# Patient Record
Sex: MALE | Race: WHITE | NOT HISPANIC OR LATINO | Employment: FULL TIME | URBAN - METROPOLITAN AREA
[De-identification: names, ages, dates, MRNs, and addresses within clinical notes are randomized per-mention and may not be internally consistent; named-entity substitution may affect disease eponyms.]

---
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
## 2020-01-20 DIAGNOSIS — F39 MOOD DISORDER (H): ICD-10-CM

## 2020-01-20 RX ORDER — CITALOPRAM HYDROBROMIDE 20 MG/1
TABLET ORAL
Qty: 31 TABLET | Refills: 2 | Status: SHIPPED | OUTPATIENT
Start: 2020-01-20 | End: 2020-03-02

## 2020-01-20 NOTE — TELEPHONE ENCOUNTER
"Requested Prescriptions   Pending Prescriptions Disp Refills     citalopram (CELEXA) 20 MG tablet [Pharmacy Med Name: CITALOPRAM HYDROBROMIDE 20MG TABS] 31 tablet 2     Sig: TAKE ONE TABLET (20MG) BY MOUTH ONCE DAILY - ADD TO THE 10MG TABLET       SSRIs Protocol Passed - 1/20/2020 10:34 AM        Passed - Recent (12 mo) or future (30 days) visit within the authorizing provider's specialty     Patient has had an office visit with the authorizing provider or a provider within the authorizing providers department within the previous 12 mos or has a future within next 30 days. See \"Patient Info\" tab in inbasket, or \"Choose Columns\" in Meds & Orders section of the refill encounter.              Passed - Medication is active on med list        Passed - Patient is age 18 or older        Last Written Prescription Date:  12/29/1+9  Last Fill Quantity: 90,  # refills: 1   Last office visit: 3/28/2019 with prescribing provider:     Future Office Visit:      "

## 2020-02-17 ENCOUNTER — OFFICE VISIT (OUTPATIENT)
Dept: FAMILY MEDICINE | Facility: CLINIC | Age: 20
End: 2020-02-17
Payer: COMMERCIAL

## 2020-02-17 VITALS
SYSTOLIC BLOOD PRESSURE: 117 MMHG | HEART RATE: 64 BPM | TEMPERATURE: 97.7 F | BODY MASS INDEX: 20.79 KG/M2 | WEIGHT: 140.4 LBS | DIASTOLIC BLOOD PRESSURE: 56 MMHG | RESPIRATION RATE: 14 BRPM | HEIGHT: 69 IN

## 2020-02-17 DIAGNOSIS — F41.1 GAD (GENERALIZED ANXIETY DISORDER): Primary | ICD-10-CM

## 2020-02-17 PROCEDURE — 99213 OFFICE O/P EST LOW 20 MIN: CPT | Performed by: NURSE PRACTITIONER

## 2020-02-17 ASSESSMENT — ANXIETY QUESTIONNAIRES
2. NOT BEING ABLE TO STOP OR CONTROL WORRYING: NOT AT ALL
5. BEING SO RESTLESS THAT IT IS HARD TO SIT STILL: NOT AT ALL
GAD7 TOTAL SCORE: 1
1. FEELING NERVOUS, ANXIOUS, OR ON EDGE: NOT AT ALL
7. FEELING AFRAID AS IF SOMETHING AWFUL MIGHT HAPPEN: NOT AT ALL
3. WORRYING TOO MUCH ABOUT DIFFERENT THINGS: NOT AT ALL
6. BECOMING EASILY ANNOYED OR IRRITABLE: SEVERAL DAYS

## 2020-02-17 ASSESSMENT — ENCOUNTER SYMPTOMS
PSYCHIATRIC NEGATIVE: 1
NERVOUS/ANXIOUS: 0
CARDIOVASCULAR NEGATIVE: 1
CONSTITUTIONAL NEGATIVE: 1
RESPIRATORY NEGATIVE: 1

## 2020-02-17 ASSESSMENT — PATIENT HEALTH QUESTIONNAIRE - PHQ9: 5. POOR APPETITE OR OVEREATING: NOT AT ALL

## 2020-02-17 ASSESSMENT — MIFFLIN-ST. JEOR: SCORE: 1642.23

## 2020-02-17 NOTE — PROGRESS NOTES
"Subjective     David Dejesus is a 19 year old male who presents to clinic today for the following health issues:    HPI     * Medication question-  Unsure if he is taking the correct dose of citalopram.  Is currently taking 30 mg.  Has been on it for just a few months.  Makes him feel crabby, has \"weird\" moods.    In addition: Doesn't feel like himself. Doesn't feel happy. No energy. Has been feeling like this for a few months. Went a week without taking it, felt a lot better/more energy. Now that he's back on it for 2 weeks, he feels more \"annoyed\" and \"bothered.\"    Denies any suicidal ideation.      Patient Active Problem List   Diagnosis     Rage attacks     ROYCE (generalized anxiety disorder)     Chronic insomnia     Acne vulgaris     History reviewed. No pertinent surgical history.    Social History     Tobacco Use     Smoking status: Never Smoker     Smokeless tobacco: Current User     Types: Chew     Tobacco comment: Uses a vape   Substance Use Topics     Alcohol use: No     History reviewed. No pertinent family history.      Current Outpatient Medications   Medication Sig Dispense Refill     citalopram (CELEXA) 10 MG tablet TAKE ONE TABLET (10MG) BY MOUTH ONCE DAILY - ADD TO THE 20MG DAILY 90 tablet 1     citalopram (CELEXA) 20 MG tablet TAKE ONE TABLET (20MG) BY MOUTH ONCE DAILY - ADD TO THE 10MG TABLET 31 tablet 2     cholecalciferol (VITAMIN D3) 5000 units TABS tablet Take 5,000 Units by mouth daily       ISOtretinoin (ABSORICA) 30 MG capsule Take 1 capsule (30 mg) by mouth 2 times daily (with meals) (Patient not taking: Reported on 2/17/2020) 60 capsule 0     melatonin 3 MG tablet Take 10 mg by mouth nightly as needed        triamcinolone (KENALOG) 0.025 % external ointment Apply twice daily affected areas on lips as needed for next 2-3 weeks. (Patient not taking: Reported on 8/2/2019) 15 g 3     triamcinolone (KENALOG) 0.1 % paste Apply twice daily to affected areas inside mouth. (Patient not taking: " Reported on 8/2/2019) 5 g 4     No Known Allergies  BP Readings from Last 3 Encounters:   02/17/20 117/56   08/02/19 100/69   07/12/19 110/56    Wt Readings from Last 3 Encounters:   02/17/20 63.7 kg (140 lb 6.4 oz) (27 %)*   07/12/19 62.8 kg (138 lb 6.4 oz) (27 %)*   02/13/19 66 kg (145 lb 6.4 oz) (42 %)*     * Growth percentiles are based on [facility] (Boys, 2-20 Years) data.                  ROYCE-7 SCORE 1/21/2019 3/28/2019 2/17/2020   Total Score - - -   Total Score 1 3 1           Reviewed and updated as needed this visit by Provider         Review of Systems   Constitutional: Negative.         Decreased energy   Respiratory: Negative.    Cardiovascular: Negative.    Psychiatric/Behavioral: Negative.  The patient is not nervous/anxious.             Objective    There were no vitals taken for this visit.  There is no height or weight on file to calculate BMI.  Physical Exam  Vitals signs and nursing note reviewed.   Constitutional:       Appearance: Normal appearance. He is not ill-appearing or toxic-appearing.   Skin:     General: Skin is warm and dry.   Neurological:      General: No focal deficit present.      Mental Status: He is alert and oriented to person, place, and time.   Psychiatric:         Mood and Affect: Mood normal.         Behavior: Behavior normal.         Thought Content: Thought content normal.         Judgment: Judgment normal.                    Assessment & Plan     1. ROYCE (generalized anxiety disorder)  Patient wants to decrease Celexa. States he felt so much better (more energy) when he was off it for a week. Denies anxiety, ROYCE filled out during visit. Denies any other s/e. Recommended he make follow-up appt with PCP in 2 weeks to assess dosage change and to see if he wants to continue to decrease and or change to a different medication.             See Patient Instructions    Return in about 2 weeks (around 3/2/2020) for Routine Visit f/u since dose decrease.    EILEEN Huntley  [facility]

## 2020-02-17 NOTE — NURSING NOTE
"Chief Complaint   Patient presents with     Medication Problem       Initial /56 (BP Location: Right arm, Patient Position: Chair, Cuff Size: Adult Regular)   Pulse 64   Temp 97.7  F (36.5  C) (Tympanic)   Resp 14   Ht 1.753 m (5' 9\")   Wt 63.7 kg (140 lb 6.4 oz)   BMI 20.73 kg/m   Estimated body mass index is 20.73 kg/m  as calculated from the following:    Height as of this encounter: 1.753 m (5' 9\").    Weight as of this encounter: 63.7 kg (140 lb 6.4 oz).    Patient presents to the clinic using No DME    Health Maintenance that is potentially due pending provider review:  NONE        Is there anyone who you would like to be able to receive your results? No  If yes have patient fill out FALGUNI      "

## 2020-02-17 NOTE — PATIENT INSTRUCTIONS
Anxiety:   1. Decrease Celexa from 30mg to 20mg daily. Take at the same time every day. If you notice any increase in anxiety or rage attacks, please follow-up with PCP right away.  2. Make follow-up appt with your PCP for 2 weeks from now to discuss change in medication.

## 2020-02-18 ASSESSMENT — ANXIETY QUESTIONNAIRES: GAD7 TOTAL SCORE: 1

## 2020-03-02 ENCOUNTER — OFFICE VISIT (OUTPATIENT)
Dept: FAMILY MEDICINE | Facility: CLINIC | Age: 20
End: 2020-03-02
Payer: COMMERCIAL

## 2020-03-02 VITALS
RESPIRATION RATE: 16 BRPM | SYSTOLIC BLOOD PRESSURE: 118 MMHG | BODY MASS INDEX: 20.97 KG/M2 | TEMPERATURE: 98.4 F | WEIGHT: 142 LBS | DIASTOLIC BLOOD PRESSURE: 64 MMHG

## 2020-03-02 DIAGNOSIS — F39 MOOD DISORDER (H): ICD-10-CM

## 2020-03-02 PROCEDURE — 99214 OFFICE O/P EST MOD 30 MIN: CPT | Performed by: NURSE PRACTITIONER

## 2020-03-02 RX ORDER — CITALOPRAM HYDROBROMIDE 20 MG/1
20 TABLET ORAL DAILY
Qty: 90 TABLET | Refills: 3 | Status: SHIPPED | OUTPATIENT
Start: 2020-03-02 | End: 2021-07-08

## 2020-03-02 NOTE — NURSING NOTE
"Chief Complaint   Patient presents with     Anxiety       Initial /64 (BP Location: Right arm, Patient Position: Chair, Cuff Size: Adult Regular)   Temp 98.4  F (36.9  C) (Tympanic)   Resp 16   Wt 64.4 kg (142 lb)   BMI 20.97 kg/m   Estimated body mass index is 20.97 kg/m  as calculated from the following:    Height as of 2/17/20: 1.753 m (5' 9\").    Weight as of this encounter: 64.4 kg (142 lb).    Patient presents to the clinic using No DME    Health Maintenance that is potentially due pending provider review:  NONE    n/a    Is there anyone who you would like to be able to receive your results? No  If yes have patient fill out FALGUNI  Levi Vanegas M.A.        "

## 2020-03-02 NOTE — PROGRESS NOTES
Subjective     David Dejesus is a 19 year old male who presents to clinic today for the following health issues:    HPI   Anxiety Follow-Up    How are you doing with your anxiety since your last visit? Improved Has decreased Celexa to 20 mg a day and states is doing better     Just the past month or so felt funny at the 30 mg     This medication works for him. Much less irritable. Working [place] now.finished High School     Are you having other symptoms that might be associated with anxiety? No    Have you had a significant life event? No     Are you feeling depressed? No    Do you have any concerns with your use of alcohol or other drugs? No    Social History     Tobacco Use     Smoking status: Never Smoker     Smokeless tobacco: Current User     Types: Chew     Tobacco comment: Uses a vape   Substance Use Topics     Alcohol use: No     Drug use: No     ROYCE-7 SCORE 1/21/2019 3/28/2019 2/17/2020   Total Score - - -   Total Score 1 3 1     PHQ 11/30/2018 1/21/2019 3/28/2019   PHQ-9 Total Score 7 4 4   Q9: Thoughts of better off dead/self-harm past 2 weeks Not at all Not at all Not at all     Last PHQ-9 3/28/2019   1.  Little interest or pleasure in doing things 0   2.  Feeling down, depressed, or hopeless 0   3.  Trouble falling or staying asleep, or sleeping too much 1   4.  Feeling tired or having little energy 1   5.  Poor appetite or overeating 2   6.  Feeling bad about yourself 0   7.  Trouble concentrating 0   8.  Moving slowly or restless 0   Q9: Thoughts of better off dead/self-harm past 2 weeks 0   PHQ-9 Total Score 4   Difficulty at work, home, or with people Not difficult at all     ROYCE-7  2/17/2020   1. Feeling nervous, anxious, or on edge 0   2. Not being able to stop or control worrying 0   3. Worrying too much about different things 0   4. Trouble relaxing 0   5. Being so restless that it is hard to sit still 0   6. Becoming easily annoyed or irritable 1   7. Feeling afraid, as if something awful might  happen 0   ROYCE-7 Total Score 1   If you checked any problems, how difficult have they made it for you to do your work, take care of things at home, or get along with other people? -         How many servings of fruits and vegetables do you eat daily?  0-1    On average, how many sweetened beverages do you drink each day (Examples: soda, juice, sweet tea, etc.  Do NOT count diet or artificially sweetened beverages)?   1    How many days per week do you exercise enough to make your heart beat faster? 3 or less    How many minutes a day do you exercise enough to make your heart beat faster? 9 or less    How many days per week do you miss taking your medication? 0    -------------------------------------    Patient Active Problem List   Diagnosis     Rage attacks     ROYCE (generalized anxiety disorder)     Chronic insomnia     Acne vulgaris     No past surgical history on file.    Social History     Tobacco Use     Smoking status: Never Smoker     Smokeless tobacco: Current User     Types: Chew     Tobacco comment: Uses a vape   Substance Use Topics     Alcohol use: No     No family history on file.        -------------------------------------  Reviewed and updated as needed this visit by Provider         Review of Systems   ROS COMP: Constitutional, HEENT, cardiovascular, pulmonary, GI, , musculoskeletal, neuro, skin, endocrine and psych systems are negative, except as otherwise noted.      Objective    /64 (BP Location: Right arm, Patient Position: Chair, Cuff Size: Adult Regular)   Temp 98.4  F (36.9  C) (Tympanic)   Resp 16   Wt 64.4 kg (142 lb)   BMI 20.97 kg/m    Body mass index is 20.97 kg/m .  Physical Exam   GENERAL: healthy, alert and no distress  EYES: Eyes grossly normal to inspection, PERRL and conjunctivae and sclerae normal  HENT: ear canals and TM's normal, nose and mouth without ulcers or lesions  NECK: no adenopathy, no asymmetry, masses, or scars and thyroid normal to palpation  RESP: lungs  clear to auscultation - no rales, rhonchi or wheezes  CV: regular rate and rhythm, normal S1 S2, no S3 or S4, no murmur, click or rub, no peripheral edema and peripheral pulses strong  ABDOMEN: soft, nontender, no hepatosplenomegaly, no masses and bowel sounds normal  MS: no gross musculoskeletal defects noted, no edema  SKIN: no suspicious lesions or rashes  NEURO: Normal strength and tone, mentation intact and speech normal  PSYCH: mentation appears normal, affect normal/bright    Diagnostic Test Results:  Labs reviewed in Epic  No results found for any visits on 03/02/20.        Assessment & Plan     David was seen today for anxiety.    Diagnoses and all orders for this visit:    Mood disorder (H)  -     citalopram (CELEXA) 20 MG tablet; Take 1 tablet (20 mg) by mouth daily    continue citalopram 20 mg daily  Flu vaccine declined  HPV will consider after discuss with Mom  See dermatology before restarting isotretinoin         Call or return to the clinic with any worsening of symptoms or no resolution. Patient/Parent verbalized understanding and is in agreement. Medication side effects reviewed.   Current Outpatient Medications   Medication Sig Dispense Refill     citalopram (CELEXA) 20 MG tablet Take 1 tablet (20 mg) by mouth daily 90 tablet 3     cholecalciferol (VITAMIN D3) 5000 units TABS tablet Take 5,000 Units by mouth daily       melatonin 3 MG tablet Take 10 mg by mouth nightly as needed        triamcinolone (KENALOG) 0.025 % external ointment Apply twice daily affected areas on lips as needed for next 2-3 weeks. (Patient not taking: Reported on 8/2/2019) 15 g 3     triamcinolone (KENALOG) 0.1 % paste Apply twice daily to affected areas inside mouth. (Patient not taking: Reported on 8/2/2019) 5 g 4       See Patient Instructions    Follow up MH 6 months can be TV if needed.    EILEEN Phelps [facility]

## 2020-05-12 ENCOUNTER — VIRTUAL VISIT (OUTPATIENT)
Dept: FAMILY MEDICINE | Facility: CLINIC | Age: 20
End: 2020-05-12
Payer: COMMERCIAL

## 2020-05-12 DIAGNOSIS — L23.7 CONTACT DERMATITIS DUE TO POISON IVY: Primary | ICD-10-CM

## 2020-05-12 PROCEDURE — 99213 OFFICE O/P EST LOW 20 MIN: CPT | Mod: 95 | Performed by: NURSE PRACTITIONER

## 2020-05-12 RX ORDER — PREDNISONE 20 MG/1
TABLET ORAL
Qty: 10 TABLET | Refills: 0 | Status: SHIPPED | OUTPATIENT
Start: 2020-05-12 | End: 2020-07-30

## 2020-05-12 NOTE — PATIENT INSTRUCTIONS
"  Patient Education     Contact Dermatitis  Contact dermatitis is a skin rash caused by something that touches the skin and makes it irritated and inflamed. Your skin may be red, swollen, dry, and may be cracked. Blisters may form and ooze. The rash will itch.  Contact dermatitis can form on the face and neck, backs of hands, forearms, genitals, and lower legs.  People can get contact dermatitis from lots of sources. These include:    Plants such as poison ivy, oak, or sumac    Chemicals in hair dyes and rinses, soaps, solvents, waxes, fingernail polish, and deodorants     Jewelry or watchbands made of nickel  Contact dermatitis is not passed from person to person.  Talk with your healthcare provider about what may have caused the rash. A type of allergy testing called \"patch testing\" may be used to discover what you are allergic to. You will need to avoid the source of your rash in the future to prevent it from coming back.  Treatment is done to relieve itching and prevent the rash from coming back. The rash should go away in a few days to a few weeks.  Home care  Your healthcare provider may prescribe medicine to relieve swelling and itching. Follow all instructions when using these medicines.  General care:    Avoid anything that heats up your skin, such as hot showers or baths, or direct sunlight. This can make itching worse.    Apply cold compresses to soothe your sores to help relieve your symptoms. Do this for 30 minutes 3 to 4 times a day. You can make a cold compress by soaking a cloth in cold water. Squeeze out excess water. You can add colloidal oatmeal to the water to help reduce itching. For severe itching in a small area, apply an ice pack wrapped in a thin towel. Do this for 20 minutes 3 to 4 times a day.    You can also try wet dressings. One way to do this is to wear a wet piece of clothing under a dry one. Wear a damp shirt under a dry shirt if your upper body is affected. This can relieve itching " and prevent you from scratching the affected area.    You can also help relieve large areas of itching by taking a lukewarm bath with colloidal oatmeal added to the water.    Use hydrocortisone cream for redness and irritation, unless another medicine was prescribed. You can also use benzocaine anesthetic cream or spray. Calamine lotion can also relieve mild symptoms.    Use oral diphenhydramine to help reduce itching. You can buy this antihistamine at drug and grocery stores. It can make you sleepy, so use lower doses during the daytime. Or you can use loratadine. This is an antihistamine that will not make you sleepy. Do not use diphenhydramine if you have glaucoma or have trouble urinating due to an enlarged prostate.    If a plant causes your rash, make sure to wash your skin and the clothes you were wearing when you came into contact with the plant. This is to wash away the plant oils that gave you the rash and prevent more or worse symptoms.    Stay away from the substance or object that causes your symptoms. If you can t avoid it, wear gloves or some other type of protection.  Follow-up care  Follow up with your healthcare provider, or as advised.  When to seek medical advice  Call your healthcare provider right away if any of these occur:    Spreading of the rash to other parts of your body    Severe swelling of your face, eyelids, mouth, throat or tongue    Trouble urinating due to swelling in the genital area    Fever of 100.4 F (38 C) or higher    Redness or swelling that gets worse    Pain that gets worse    Foul-smelling fluid leaking from the skin    Yellow-brown crusts on the open blisters  Date Last Reviewed: 9/1/2016 2000-2019 [employer]. [address] [place] [address], [place] [address]. All rights reserved. This information is not intended as a substitute for professional medical care. Always follow your healthcare professional's instructions.           Patient Education     Managing a  Poison Ivy, Poison Oak, or Poison Sumac Reaction  If you come in contact with urushiol    If you think you may have come in contact with the sap oil (called urushiol) contained in poison ivy, poison oak, or poison sumac plants, wash the affected part of your skin. Do this within 15 minutes after contact. Use water or preferably, soap and water.  Undress, and wash your clothes and gear as soon as you can. Be sure to wash any pet that was with you. Taking these steps can help prevent spreading sap oil to someone else. If you have a rash, but are not sure if it is from one of these plants, see your healthcare provider.  To soothe the itching  Your skin may react to poison oak, poison ivy, and poison sumac within hours to a few days after contact. Once you have come into contact with these plants, you can t stop the reaction. But you can take these steps to soothe the itching:    Don t scratch or scrub your rash. Not even if the itching is severe. Scratching can lead to infection.    Bathe in lukewarm (not hot) water. Or take short cool showers to relieve the itching. For a more soothing bath, add oatmeal to the water.    Use antihistamines that are taken by mouth. These include diphenhydramine. You can buy these at the pharmacy. Talk to your healthcare provider or pharmacist for more information on oral antihistamines.    Use over-the-counter treatments on your skin. These include cortisone and calamine lotion.  How your skin may react  A mild rash may become red, swollen, and itchy. The rash may form a line on your skin where you brushed against the plant. If you have a severe rash, your itching may worsen. And your rash may blister and ooze. If this happens, seek medical care. The fluid from your blisters will not make your rash spread. With or without medical care, your rash may last up to 3 weeks. In the future, try to avoid coming in contact with these plants.  When to call your healthcare provider  Call your  healthcare provider if:    Your rash is severe    The rash spreads beyond the exposed part of your body or affects your face.    The rash does not clear up within a few weeks  You may be given medicine to take by mouth or apply directly on the skin.     Call 911  Call 911 if you have any of the following:    Trouble breathing or swallowing    Any significant swelling   Date Last Reviewed: 3/1/2017    7151-3951 [employer] [address] [place] [address], [place] [address]. All rights reserved. This information is not intended as a substitute for professional medical care. Always follow your healthcare professional's instructions.

## 2020-05-12 NOTE — PROGRESS NOTES
"David Dejesus is a 19 year old male who is being evaluated via a billable video visit.      The patient has been notified of following:     \"This video visit will be conducted via a call between you and your physician/provider. We have found that certain health care needs can be provided without the need for an in-person physical exam.  This service lets us provide the care you need with a video conversation.  If a prescription is necessary we can send it directly to your pharmacy.  If lab work is needed we can place an order for that and you can then stop by our lab to have the test done at a later time.    Video visits are billed at different rates depending on your insurance coverage.  Please reach out to your insurance provider with any questions.    If during the course of the call the physician/provider feels a video visit is not appropriate, you will not be charged for this service.\"    Patient has given verbal consent for Video visit? Yes    How would you like to obtain your AVS? [facility]    Patient would like the video invitation sent by: Text to cell phone: [phone]    Will anyone else be joining your video visit? No      Subjective     David Dejesus is a 19 year old male who presents today via video visit for the following health issues:    HPI  Rash      Duration: few days    Description  Location: leg and neck/chin  Itching: moderate    Intensity:  moderate    Accompanying signs and symptoms: red rash,     History (similar episodes/previous evaluation): None    Precipitating or alleviating factors:  New exposures:  None  Recent travel: YES- [employer] for work     Therapies tried and outcome: none         Video Start Time: 1:33 PM      Patient Active Problem List   Diagnosis     Rage attacks     ROYCE (generalized anxiety disorder)     Chronic insomnia     Acne vulgaris     No past surgical history on file.    Social History     Tobacco Use     Smoking status: Never Smoker     Smokeless tobacco: Current " "User     Types: Chew     Tobacco comment: Uses a vape   Substance Use Topics     Alcohol use: No     No family history on file.      Current Outpatient Medications   Medication Sig Dispense Refill     citalopram (CELEXA) 20 MG tablet Take 1 tablet (20 mg) by mouth daily 90 tablet 3     predniSONE (DELTASONE) 20 MG tablet Take one tablet twice a day for 5 days. 10 tablet 0     cholecalciferol (VITAMIN D3) 5000 units TABS tablet Take 5,000 Units by mouth daily       melatonin 3 MG tablet Take 10 mg by mouth nightly as needed        triamcinolone (KENALOG) 0.025 % external ointment Apply twice daily affected areas on lips as needed for next 2-3 weeks. (Patient not taking: Reported on 8/2/2019) 15 g 3     triamcinolone (KENALOG) 0.1 % paste Apply twice daily to affected areas inside mouth. (Patient not taking: Reported on 8/2/2019) 5 g 4     No Known Allergies  Recent Labs   Lab Test 08/02/19  1054 07/12/19  1020 06/11/19  0835  12/26/18  1252   LDL 95 94 100   < >  --    ALT 19 18 17   < >  --    CR 0.90 0.85 0.96   < >  --    GFRESTIMATED >90 >90 >90   < >  --    GFRESTBLACK >90 >90 >90   < >  --    POTASSIUM 4.3 4.2 4.2   < >  --    TSH  --   --   --   --  1.46    < > = values in this interval not displayed.      BP Readings from Last 3 Encounters:   03/02/20 118/64   02/17/20 117/56   08/02/19 100/69    Wt Readings from Last 3 Encounters:   03/02/20 64.4 kg (142 lb) (29 %)*   02/17/20 63.7 kg (140 lb 6.4 oz) (27 %)*   07/12/19 62.8 kg (138 lb 6.4 oz) (27 %)*     * Growth percentiles are based on CDC (Boys, 2-20 Years) data.                    Reviewed and updated as needed this visit by Provider         Review of Systems   Constitutional, HEENT, cardiovascular, pulmonary, gi and gu systems are negative, except as otherwise noted.      Objective    There were no vitals taken for this visit.  Estimated body mass index is 20.97 kg/m  as calculated from the following:    Height as of 2/17/20: 1.753 m (5' 9\").    Weight " as of 3/2/20: 64.4 kg (142 lb).  Physical Exam     GENERAL: Healthy, alert and no distress  EYES: Eyes grossly normal to inspection.  No discharge or erythema, or obvious scleral/conjunctival abnormalities.  RESP: No audible wheeze, cough, or visible cyanosis.  No visible retractions or increased work of breathing.    SKIN: Visible skin clear. Examination of the rash to legs reveals: Poison Ivy: clusters of well-defined erythematous vesicles with clear drainage.  NEURO: Cranial nerves grossly intact.  Mentation and speech appropriate for age.  PSYCH: Mentation appears normal, affect normal/bright, judgement and insight intact, normal speech and appearance well-groomed.              Assessment & Plan   (L23.7) Contact dermatitis due to poison ivy  (primary encounter diagnosis)  Comment:   Plan: predniSONE (DELTASONE) 20 MG tablet             Tobacco Cessation:   reports that he has never smoked. His smokeless tobacco use includes chew.          See Patient Instructions    Return in about 1 week (around 5/19/2020), or if symptoms worsen or fail to improve.    EILEEN Pinedo CNP  [facility]      Video-Visit Details    Type of service:  Video Visit    Video End Time:1:48 PM    Originating Location (pt. Location): Home    Distant Location (provider location):  [facility]     Platform used for Video Visit: Doximity    Return in about 1 week (around 5/19/2020), or if symptoms worsen or fail to improve.       EILEEN Pinedo CNP

## 2020-07-30 ENCOUNTER — VIRTUAL VISIT (OUTPATIENT)
Dept: FAMILY MEDICINE | Facility: CLINIC | Age: 20
End: 2020-07-30
Payer: COMMERCIAL

## 2020-07-30 DIAGNOSIS — F39 MOOD DISORDER (H): ICD-10-CM

## 2020-07-30 DIAGNOSIS — F41.1 GAD (GENERALIZED ANXIETY DISORDER): Primary | ICD-10-CM

## 2020-07-30 DIAGNOSIS — J02.9 SORE THROAT: ICD-10-CM

## 2020-07-30 DIAGNOSIS — R51.9 ACUTE NONINTRACTABLE HEADACHE, UNSPECIFIED HEADACHE TYPE: ICD-10-CM

## 2020-07-30 PROCEDURE — 99214 OFFICE O/P EST MOD 30 MIN: CPT | Mod: 95 | Performed by: NURSE PRACTITIONER

## 2020-07-30 RX ORDER — CITALOPRAM HYDROBROMIDE 40 MG/1
40 TABLET ORAL DAILY
Qty: 90 TABLET | Refills: 3 | Status: SHIPPED | OUTPATIENT
Start: 2020-07-30 | End: 2021-07-08

## 2020-07-30 ASSESSMENT — ANXIETY QUESTIONNAIRES
1. FEELING NERVOUS, ANXIOUS, OR ON EDGE: NOT AT ALL
GAD7 TOTAL SCORE: 3
IF YOU CHECKED OFF ANY PROBLEMS ON THIS QUESTIONNAIRE, HOW DIFFICULT HAVE THESE PROBLEMS MADE IT FOR YOU TO DO YOUR WORK, TAKE CARE OF THINGS AT HOME, OR GET ALONG WITH OTHER PEOPLE: NOT DIFFICULT AT ALL
5. BEING SO RESTLESS THAT IT IS HARD TO SIT STILL: SEVERAL DAYS
7. FEELING AFRAID AS IF SOMETHING AWFUL MIGHT HAPPEN: NOT AT ALL
6. BECOMING EASILY ANNOYED OR IRRITABLE: MORE THAN HALF THE DAYS
3. WORRYING TOO MUCH ABOUT DIFFERENT THINGS: NOT AT ALL
2. NOT BEING ABLE TO STOP OR CONTROL WORRYING: NOT AT ALL

## 2020-07-30 ASSESSMENT — PATIENT HEALTH QUESTIONNAIRE - PHQ9
5. POOR APPETITE OR OVEREATING: NOT AT ALL
SUM OF ALL RESPONSES TO PHQ QUESTIONS 1-9: 4

## 2020-07-30 NOTE — PATIENT INSTRUCTIONS
"Discharge Instructions for COVID-19 Patients  You have--or may have--COVID-19. Please follow the instructions listed below.   If you have a weakened immune system, discuss with your doctor any other actions you need to take.  How can I protect others?  If you have symptoms (fever, cough, body aches or trouble breathing):    Stay home and away from others (self-isolate) until:  ? At least 10 days have passed since your symptoms started. And   ? You've had no fever--and no medicine that reduces fever--for 3 full days (72 hours). And   ? Your other symptoms have resolved (gotten better).  If you don't show symptoms, but testing showed that you have COVID-19:    Stay home and away from others (self-isolate) until at least 10 days have passed since the date of your first positive COVID-19 test.  During this time    Stay in your own room, even for meals. Use your own bathroom if you can.    Stay away from others in your home. No hugging, kissing or shaking hands. No visitors.    Don't go to work, school or anywhere else.    Clean \"high touch\" surfaces often (doorknobs, counters, handles). Use household cleaning spray or wipes. You'll find a full list of  on the EPA website: www.epa.gov/pesticide-registration/list-n-disinfectants-use-against-sars-cov-2.    Cover your mouth and nose with a mask, tissue or wash cloth to avoid spreading germs.    Wash your hands and face often. Use soap and water.    Caregivers in these groups are at risk for severe illness due to COVID-19:  ? People 65 years and older  ? People who live in a nursing home or long-term care facility  ? People with chronic disease (lung, heart, cancer, diabetes, kidney, liver, immunologic)  ? People who have a weakened immune system, including those who:    Are in cancer treatment    Take medicine that weakens the immune system, such as corticosteroids    Had a bone marrow or organ transplant    Have an immune deficiency    Have poorly controlled HIV or " AIDS    Are obese (body mass index of 40 or higher)    Smoke regularly    Caregivers should wear gloves while washing dishes, handling laundry and cleaning bedrooms and bathrooms.    Use caution when washing and drying laundry: Don't shake dirty laundry and use the warmest water setting that you can.    For more tips on managing your health at home, go to www.cdc.gov/coronavirus/2019-ncov/downloads/10Things.pdf.  How can I take care of myself at home?  1. Get lots of rest. Drink extra fluids (unless a doctor has told you not to).  2. Take Tylenol (acetaminophen) for fever or pain. If you have liver or kidney problems, ask your family doctor if it's okay to take Tylenol.     Adults can take either:  ? 650 mg (two 325 mg pills) every 4 to 6 hours, or   ? 1,000 mg (two 500 mg pills) every 8 hours as needed.  ? Note: Don't take more than 3,000 mg in one day. Acetaminophen is found in many medicines (both prescribed and over-the-counter medicines). Read all labels to be sure you don't take too much.   For children, check the Tylenol bottle for the right dose. The dose is based on the child's age or weight.  3. If you have other health problems (like cancer, heart failure, an organ transplant or severe kidney disease): Call your specialty clinic if you don't feel better in the next 2 days.  4. Know when to call 911. Emergency warning signs include:  ? Trouble breathing or shortness of breath  ? Pain or pressure in the chest that doesn't go away  ? Feeling confused like you haven't felt before, or not being able to wake up  ? Bluish-colored lips or face  5. Your doctor may have prescribed a blood thinner medicine. Follow their instructions.  Where can I get more information?     [facility] - About COVID-19:   [email or web address]    CDC - What to Do If You're Sick: www.cdc.gov/coronavirus/2019-ncov/about/steps-when-sick.html    CDC - Ending Home Isolation:  www.cdc.gov/coronavirus/2019-ncov/hcp/disposition-in-home-patients.html    CDC - Caring for Someone: www.cdc.gov/coronavirus/2019-ncov/if-you-are-sick/care-for-someone.html    [facility] - Interim Guidance for Hospital Discharge to Home: www.[facility].[place]/diseases/coronavirus/hcp/hospdischarge.pdf    [facility] clinical trials (COVID-19 research studies): clinicalaffairs.[facility]-clinical-trials    Below are the COVID-19 hotlines at the [place] Department of Health ([facility]). Interpreters are available.  ? For health questions: Call 935-210-4636 or 1-118.895.9968 (7 a.m. to 7 p.m.)  ? For questions about schools and childcare: Call 692-104-5192 or 1-770.113.5984 (7 a.m. to 7 p.m.)    For informational purposes only. Not to replace the advice of your health care provider. Clinically reviewed by the Infection Prevention Team.Copyright   2020 [facility]. All rights reserved. [employer] [id number] - 06/20.

## 2020-07-30 NOTE — PROGRESS NOTES
"David Dejesus is a 19 year old male who is being evaluated via a billable video visit.      The patient has been notified of following:     \"This video visit will be conducted via a call between you and your physician/provider. We have found that certain health care needs can be provided without the need for an in-person physical exam.  This service lets us provide the care you need with a video conversation.  If a prescription is necessary we can send it directly to your pharmacy.  If lab work is needed we can place an order for that and you can then stop by our lab to have the test done at a later time.    Video visits are billed at different rates depending on your insurance coverage.  Please reach out to your insurance provider with any questions.    If during the course of the call the physician/provider feels a video visit is not appropriate, you will not be charged for this service.\"    Patient has given verbal consent for Video visit? Yes  How would you like to obtain your AVS? Mail a copy  If you are dropped from the video visit, the video invite should be resent to: Text to cell phone: [phone]  Will anyone else be joining your video visit? No      Subjective     David Dejesus is a 19 year old male who presents today via video visit for the following health issues:    HPI    Depression and Anxiety Follow-Up    How are you doing with your depression since your last visit? Improved some but still very irritable.     How are you doing with your anxiety since your last visit?  No change    Are you having other symptoms that might be associated with depression or anxiety? No    Have you had a significant life event? No     Do you have any concerns with your use of alcohol or other drugs? No    Social History     Tobacco Use     Smoking status: Never Smoker     Smokeless tobacco: Current User     Types: Chew     Tobacco comment: Uses a vape   Substance Use Topics     Alcohol use: No     Drug use: No     PHQ " 1/21/2019 3/28/2019 7/30/2020   PHQ-9 Total Score 4 4 4   Q9: Thoughts of better off dead/self-harm past 2 weeks Not at all Not at all Not at all     ROYCE-7 SCORE 3/28/2019 2/17/2020 7/30/2020   Total Score - - -   Total Score 3 1 3     Last PHQ-9 7/30/2020   1.  Little interest or pleasure in doing things 1   2.  Feeling down, depressed, or hopeless 0   3.  Trouble falling or staying asleep, or sleeping too much 1   4.  Feeling tired or having little energy 1   5.  Poor appetite or overeating 1   6.  Feeling bad about yourself 0   7.  Trouble concentrating 0   8.  Moving slowly or restless 0   Q9: Thoughts of better off dead/self-harm past 2 weeks 0   PHQ-9 Total Score 4   Difficulty at work, home, or with people Not difficult at all     ROYCE-7  7/30/2020   1. Feeling nervous, anxious, or on edge 0   2. Not being able to stop or control worrying 0   3. Worrying too much about different things 0   4. Trouble relaxing 0   5. Being so restless that it is hard to sit still 1   6. Becoming easily annoyed or irritable 2   7. Feeling afraid, as if something awful might happen 0   ROYCE-7 Total Score 3   If you checked any problems, how difficult have they made it for you to do your work, take care of things at home, or get along with other people? Not difficult at all       Suicide Assessment Five-step Evaluation and Treatment (SAFE-T)        Throat SYMPTOMS      Duration: 4 days - getting better    Description  nasal congestion, sore throat, chills, headache and fatigue/malaise    Severity: moderate    Accompanying signs and symptoms: None    History (predisposing factors):  Girlfriend had covid test today     Precipitating or alleviating factors: None    Therapies tried and outcome:  None    SORE THROAT   IN [place] LAST WEEK WITH FRIENDS  NO SOCIAL DISTANCING AND NO MASK USE  Very tired. GF got tested today for COVID.   Low grade temp  Blister like rash left leg yesterday. Resolved today.          Video Start Time:  419        Patient Active Problem List   Diagnosis     Rage attacks     ROYCE (generalized anxiety disorder)     Chronic insomnia     Acne vulgaris     No past surgical history on file.    Social History     Tobacco Use     Smoking status: Never Smoker     Smokeless tobacco: Current User     Types: Chew     Tobacco comment: Uses a vape   Substance Use Topics     Alcohol use: No     No family history on file.        Reviewed and updated as needed this visit by Provider         Review of Systems   Constitutional, HEENT, cardiovascular, pulmonary, GI, , musculoskeletal, neuro, skin, endocrine and psych systems are negative, except as otherwise noted.      Objective         Physical Exam     GENERAL: Healthy, alert and no distress  EYES: Eyes grossly normal to inspection.  No discharge or erythema, or obvious scleral/conjunctival abnormalities.  RESP: No audible wheeze, cough, or visible cyanosis.  No visible retractions or increased work of breathing.    SKIN: Visible skin clear. Facial acne present.   No significant rash, abnormal pigmentation or lesions.  NEURO: Cranial nerves grossly intact.  Mentation and speech appropriate for age.  PSYCH: Mentation appears normal, affect normal/bright, judgement and insight intact, normal speech and appearance well-groomed.      Diagnostic Test Results:  Labs reviewed in Epic        Assessment & Plan     David was seen today for depression and pharyngitis.    Diagnoses and all orders for this visit:    ROYCE (generalized anxiety disorder)  -     citalopram (CELEXA) 40 MG tablet; Take 1 tablet (40 mg) by mouth daily  -     MENTAL HEALTH REFERRAL  - Adult; Outpatient Treatment; Individual/Couples/Family/Group Therapy/Health Psychology; [facility] [place]: [facility] [phone]; We will contact you to schedule the appointment or please call with any questions    Mood disorder (H)  -     citalopram (CELEXA) 40 MG tablet; Take 1 tablet (40 mg) by mouth daily  -     [facility] HEALTH  REFERRAL  - Adult; Outpatient Treatment; Individual/Couples/Family/Group Therapy/Health Psychology[place] [facility] [place] [facility] [phone]; We will contact you to schedule the appointment or please call with any questions    Sore throat  -     Symptomatic COVID-19 Virus (Coronavirus) by PCR; Future    Acute nonintractable headache, unspecified headache type  -     Symptomatic COVID-19 Virus (Coronavirus) by PCR; Future      Increase citalopram to 40 mg daily  See  therapist for anger reduction strategies.   Get COVID testing done.  Quarantine at home   Sign up for [facility] for communication       Tobacco Cessation:   reports that he has never smoked. His smokeless tobacco use includes chew.          Call or return to the clinic with any worsening of symptoms or no resolution. Patient/Parent verbalized understanding and is in agreement. Medication side effects reviewed.   Current Outpatient Medications   Medication Sig Dispense Refill     citalopram (CELEXA) 20 MG tablet Take 1 tablet (20 mg) by mouth daily 90 tablet 3     citalopram (CELEXA) 40 MG tablet Take 1 tablet (40 mg) by mouth daily 90 tablet 3     cholecalciferol (VITAMIN D3) 5000 units TABS tablet Take 5,000 Units by mouth daily       melatonin 3 MG tablet Take 10 mg by mouth nightly as needed          See Patient Instructions        As the provider for this telephone/video service, I attest that I introduced myself to the patient, provided my credentials, disclosed my location, and determined that, based on a review of the patient's chart and/or a discussion with members of the patient's treatment team, a telephone/video visit is an appropriate and effective means of providing this service. The patient and I mutually agree that this visit is appropriate for telephone/video visit as well.    Video-Visit Details    Type of service:  Video Visit    Video End Time:437    Originating Location (pt. Location): Home    Distant Location (provider  location):  [facility]     Platform used for Video Visit: Sarah Sam MSN,FNP-[facility]  [id number] [place] [id number] [phone]        Follow up MH 3 months.

## 2020-07-31 ASSESSMENT — ANXIETY QUESTIONNAIRES: GAD7 TOTAL SCORE: 3

## 2020-08-02 DIAGNOSIS — R51.9 ACUTE NONINTRACTABLE HEADACHE, UNSPECIFIED HEADACHE TYPE: ICD-10-CM

## 2020-08-02 DIAGNOSIS — J02.9 SORE THROAT: ICD-10-CM

## 2020-08-02 PROCEDURE — U0003 INFECTIOUS AGENT DETECTION BY NUCLEIC ACID (DNA OR RNA); SEVERE ACUTE RESPIRATORY SYNDROME CORONAVIRUS 2 (SARS-COV-2) (CORONAVIRUS DISEASE [COVID-19]), AMPLIFIED PROBE TECHNIQUE, MAKING USE OF HIGH THROUGHPUT TECHNOLOGIES AS DESCRIBED BY CMS-2020-01-R: HCPCS | Performed by: NURSE PRACTITIONER

## 2020-08-02 NOTE — PROGRESS NOTES
COVID-19 PCR test completed. Patient handout For Patients Who Have Been Tested for Covid-19 (Coronavirus) was given to the patient, which includes test result notification process.     

## 2020-08-02 NOTE — LETTER
August 4, 2020      David Dejesus  [address] [address]        Dear ,    We are writing to inform you of your test results.          [facility] -NEGATIVE COVID TESTING   You could still have COVID but tested negative. Unfortunately There is a high percentage of false negative results.   Call or return to the clinic with any worsening of symptoms or no resolution.   Please follow the CDC guidelines for prevention of spread.       Your symptoms show that you may have coronavirus (COVID-19). This illness can cause fever, cough and trouble breathing. Many people get a mild case and get better on their own. Some people can get very sick.       How can I protect others?     Without a test, we can t know for sure that you have COVID-19. For safety, it s very important to follow these rules.     Stay home and away from others (self-isolate) until:   You ve had no fever--and no medicine that reduces fever--for 3 full days (72 hours). And    Your other symptoms have resolved (gotten better). For example, your cough or breathing has improved. And    At least 10 days have passed since your symptoms started.     During this time:   Stay in your own room (and use your own bathroom), if you can.   Stay away from others in your home. No hugging, kissing or shaking hands.   No visitors.   Don t go to work, school or anywhere else.   Clean  high touch  surfaces often (doorknobs, counters, handles, etc.). Use a household cleaning spray or wipes.   Cover your mouth and nose with a mask, tissue or washcloth to avoid spreading germs.   Wash your hands and face often. Use soap and water.   For more tips, go to https://www.cdc.gov/coronavirus/2019-ncov/downloads/10Things.pdf.     How can I take care of myself?     Get lots of rest. Drink extra fluids (unless a doctor has told you not to).     Take Tylenol (acetaminophen) for fever or pain. If you have liver or kidney problems, ask your family doctor if it's okay to  take Tylenol.     Adults can take either:   650 mg (two 325 mg pills) every 4 to 6 hours, or    1,000 mg (two 500 mg pills) every 8 hours as needed.   Note: Don't take more than 3,000 mg in one day.   Acetaminophen is found in many medicines (both prescribed and over-the-counter medicines). Read all labels to be sure you don't take too much.   For children, check the Tylenol bottle for the right dose. The dose is based on the child's age or weight.     If you have other health problems (like cancer, heart failure, an organ transplant or severe kidney disease): Call your specialty clinic if you don't feel better in the next 2 days.     Know when to call 911: If your breathing is so bad that it keeps you from doing normal activities, call 911 or go to the emergency room. Tell them that you've been staying home and may have COVID-19.     Thank you for taking steps to prevent the spread of this virus.   Limit your contact with others.   Wear a simple mask to cover your cough.   Wash your hands well and often.   If you need medical care, go to [email or web address] or contact your health care provider.       For more about COVID-19 and caring for yourself at home, visit the CDC website at https://www.cdc.gov/coronavirus/2019-ncov/about/steps-when-sick.html.     To learn about care at [facility], please go to [email or web address].     [facility] clinical trials (COVID-19 research studies): [email or web address]-clinical-trials.     Below are the COVID-19 hotlines at the [place] Department [facility] ([facility]). Interpreters are available.   For health questions: Call 645-711-7773 or [phone] (7 a.m. to 7 p.m.)   For questions about schools and childcare: Call 701-288-9963 or 1-689.380.3745 (7 a.m. to 7 p.m.)              Resulted Orders   Symptomatic COVID-19 Virus (Coronavirus) by PCR   Result Value Ref Range    COVID-19 Virus PCR to [facility] - Source Nasopharyngeal      COVID-19 Virus PCR to [facility] - Result Not Detected       Comment:      Collection of multiple specimens from the same patient may be necessary to   detect the virus. The possibility of a false negative should be considered if   the patient's recent exposure or clinical presentation suggests 2019 nCOV   infection and diagnostic tests for other causes of illness are negative.   Repeat testing may be considered in this setting.  Viral RNA was extracted via a validated method and subsequently underwent   single step reverse transcriptase-real time polymerase chain reaction using   primers to the CDC specified N1,N2 gene targets of CoV2 and human RNP as an   internal control.  A negative result does not rule out the presence of real-time PCR inhibitors   in the specimen or COVID-19 RNA in concentrations below the limit of detection   of the assay. The possibility of a false negative should be considered if the   patients recent exposure or clinical presentation suggests COVID-19.   Additional testing or repeat testing requires consultation with the   laborator  y.  Nasopharyngeal specimen is the preferred choice for swab-based SARS CoV2   testing. When collection of a nasopharyngeal swab is not possible the   following are acceptable alternatives:  an oropharyngeal (OP) specimen collected by a healthcare professional, or a   nasal mid-turbinate (NMT) swab collected by a healthcare professional or by   onsite self-collection (using a flocked tapered swab), or an anterior nares   specimen collected by a healthcare professional or by onsite self-collection   (using a round foam swab). (Centers for Disease Control)  Testing performed by [facility], Room 1[address]210, [address] [place] [address]. This test was developed and its   performance characteristics determined by the [facility]. It has not been cleared or approved by the FDA.  The laboratory is  regulated under the Clinical Laboratory Improvement   Amendments of 1988 (CLIA-88) as qualified to perform high-complexity testin  g.   This test is used for clinical purposes. It should not be regarded as   investigational or for research.         If you have any questions or concerns, please call the clinic at the number listed above.       Sincerely,        EILEEN Castañeda CNP

## 2020-08-03 LAB
SARS-COV-2 RNA SPEC QL NAA+PROBE: NOT DETECTED
SPECIMEN SOURCE: NORMAL

## 2020-11-10 ENCOUNTER — HOSPITAL ENCOUNTER (EMERGENCY)
Facility: CLINIC | Age: 20
Discharge: HOME | End: 2020-11-10
Attending: EMERGENCY MEDICINE | Admitting: EMERGENCY MEDICINE
Payer: COMMERCIAL

## 2020-11-10 VITALS
WEIGHT: 145 LBS | SYSTOLIC BLOOD PRESSURE: 135 MMHG | RESPIRATION RATE: 16 BRPM | TEMPERATURE: 98 F | BODY MASS INDEX: 21.41 KG/M2 | DIASTOLIC BLOOD PRESSURE: 80 MMHG | OXYGEN SATURATION: 99 % | HEART RATE: 75 BPM

## 2020-11-10 DIAGNOSIS — T14.8XXA LOCAL INFECTION OF WOUND: ICD-10-CM

## 2020-11-10 DIAGNOSIS — L08.9 LOCAL INFECTION OF WOUND: ICD-10-CM

## 2020-11-10 PROCEDURE — 99284 EMERGENCY DEPT VISIT MOD MDM: CPT | Performed by: EMERGENCY MEDICINE

## 2020-11-10 PROCEDURE — 99282 EMERGENCY DEPT VISIT SF MDM: CPT | Performed by: EMERGENCY MEDICINE

## 2020-11-10 RX ORDER — CIPROFLOXACIN 500 MG/1
500 TABLET, FILM COATED ORAL 2 TIMES DAILY
Qty: 14 TABLET | Refills: 0 | Status: SHIPPED | OUTPATIENT
Start: 2020-11-10 | End: 2020-11-17

## 2020-11-10 NOTE — ED PROVIDER NOTES
History     Chief Complaint   Patient presents with     Foreign Body in Skin     L earring inbedded in ear.      HPI  David Dejesus is a 20 year old male with pain in left ear at site at earring. Otherwise well.  Ears pierced bilaterally over 1 week ago.  2 days of pain on left earing.  Some swelling and earring  was too tight to remove.  He was concerned about infection.  No problems with his right earing.  No fevers, chills, headache, change in hearing.  No ear pain or ear drainage.  Earring removed by nurse small amount of pus noted. No headache. Smoker. Not currently on any medications, no known allergies.         The patient's PMHx, Surgical Hx, Allergies, and Medications were all reviewed with the patient.    Allergies:  No Known Allergies    Problem List:    Patient Active Problem List    Diagnosis Date Noted     Acne vulgaris 06/11/2019     Priority: Medium     Rage attacks 11/30/2018     Priority: Medium     ROYCE (generalized anxiety disorder) 11/30/2018     Priority: Medium     Chronic insomnia 11/30/2018     Priority: Medium        Past Medical History:    No past medical history on file.    Past Surgical History:    No past surgical history on file.    Family History:    No family history on file.    Social History:  Marital Status:  Single [1]  Social History     Tobacco Use     Smoking status: Never Smoker     Smokeless tobacco: Current User     Types: Chew     Tobacco comment: Uses a vape   Substance Use Topics     Alcohol use: No     Drug use: No        Medications:         ciprofloxacin (CIPRO) 500 MG tablet       cholecalciferol (VITAMIN D3) 5000 units TABS tablet       citalopram (CELEXA) 20 MG tablet       citalopram (CELEXA) 40 MG tablet       melatonin 3 MG tablet          Review of Systems   Constitutional: Negative for chills and fever.   HENT: Positive for ear pain. Negative for ear discharge and facial swelling.    Skin: Positive for wound.   Neurological: Negative for headaches.        Physical Exam   BP: (!) 145/77  Pulse: 77  Temp: 98  F (36.7  C)  Resp: 16  Weight: 65.8 kg (145 lb)  SpO2: 100 %    Physical Exam  GEN: Awake, alert, and cooperative. No acute distress  HENT: redness at site of piercing of left ear lobe. Jewelry removed by nursing staff and reported small amount of purulence. TMs non bulging and non erythematous. No lesions in external canals. No edema of pinna. No tragal tenderness. No mastoid tenderness.   EYES: EOM intact. Conjunctiva clear. No discharge.   NECK: Supple, symmetric. Non tender.   CV : extremities warm and well perfused.   PULM: Normal effort.   NEURO: Answering questions and interacting appropriately.   INT: warm and dry. See HENT above       ED Course        Procedures           Critical Care time:  none               No results found for this or any previous visit (from the past 24 hour(s)).    Medications - No data to display    Assessments & Plan (with Medical Decision Making)   20 year old male with past medical history of anxiety with pain and swelling at site of ~10 day old piercing of left ear lobe. Patient unable to remove jewelry. Jewelry removed by nursing staff.  On arrival to Emergency Department, vital signs were blood pressure 146 5/77, temperature 98, pulse 77, respiratory rate 16, SPO2 100% on room air.    Initial evaluation he did not appear acutely distressed.  There was a small amount of redness and edema, and a hole was closed area does not appear acutely infected.  Will do a wait-and-see approach with antibiotics.  Patient sent with written prescription for ciprofloxacin.  Discussed risks and benefits and potential side effects.  Will start prescription tomorrow if any increased pain, redness, swelling, discharge, or warmth.  Left ear.  Normal TM, canals without lesions.  No tragal tenderness.  No tender to pinna.    I have reviewed the nursing notes.         Discharge Medication List as of 11/10/2020 12:07 PM      START taking these  medications    Details   ciprofloxacin (CIPRO) 500 MG tablet Take 1 tablet (500 mg) by mouth 2 times daily for 7 days, Disp-14 tablet, R-0, Local Print             Final diagnoses:   Local infection of wound - left ear lobe     Greyson Escalera MD    11/10/2020   [facility] [place] [facility] EMERGENCY DEPT    Disclaimer: This note consists of words and symbols derived from keyboarding and dictation using voice recognition software.  As a result, there may be errors that have gone undetected.  Please consider this when interpreting information found in this note.             Greyson Escalera MD  11/11/20 0636

## 2020-11-10 NOTE — DISCHARGE INSTRUCTIONS
If you have any increase of pain, swelling, redness, warmth, or discharge from your left ear, start taking antibiotics as prescribed.  Twice daily for 7 days.  If your left ear feels better tomorrow you do not need to start antibiotics.  If you start taking antibiotics or have any concerns, you should follow-up with your primary care physician in 1 week to monitor treatment response.  If your symptoms worsen or you develop any new or concerning symptoms, you should turn to the emergency department for further evaluation and treatment.

## 2020-11-10 NOTE — ED AVS SNAPSHOT
[facility] Emergency Dept  [address] [facility] [address]  Phone: [phone]  Fax: [phone]                                    David Dejesus   MRN: [id number]    Department: [facility] Emergency Dept   Date of Visit: 11/10/2020           After Visit Summary Signature Page    I have received my discharge instructions, and my questions have been answered. I have discussed any challenges I see with this plan with the nurse or doctor.    ..........................................................................................................................................  Patient/Patient Representative Signature      ..........................................................................................................................................  Patient Representative Print Name and Relationship to Patient    ..................................................               ................................................  Date                                   Time    ..........................................................................................................................................  Reviewed by Signature/Title    ...................................................              ..............................................  Date                                               Time          22EPIC Rev 08/18

## 2020-11-10 NOTE — ED NOTES
Today noticed his earring in his left ear was stuck and too tight to remove. Earring digging into front of earlobe. Area tender to touch.  Earring removed by nurse, small amount of pus and blood upon removal  No active bleeding

## 2020-11-11 ASSESSMENT — ENCOUNTER SYMPTOMS
FACIAL SWELLING: 0
WOUND: 1
FEVER: 0
CHILLS: 0
HEADACHES: 0

## 2021-07-08 ENCOUNTER — VIRTUAL VISIT (OUTPATIENT)
Dept: FAMILY MEDICINE | Facility: CLINIC | Age: 21
End: 2021-07-08
Payer: COMMERCIAL

## 2021-07-08 DIAGNOSIS — R45.4 IRRITABILITY AND ANGER: ICD-10-CM

## 2021-07-08 DIAGNOSIS — F41.1 GAD (GENERALIZED ANXIETY DISORDER): Primary | ICD-10-CM

## 2021-07-08 PROCEDURE — 99213 OFFICE O/P EST LOW 20 MIN: CPT | Mod: 95 | Performed by: NURSE PRACTITIONER

## 2021-07-08 RX ORDER — BUPROPION HYDROCHLORIDE 100 MG/1
100 TABLET ORAL 2 TIMES DAILY
Qty: 60 TABLET | Refills: 0 | Status: SHIPPED | OUTPATIENT
Start: 2021-07-08 | End: 2022-01-19

## 2021-07-08 ASSESSMENT — PATIENT HEALTH QUESTIONNAIRE - PHQ9
SUM OF ALL RESPONSES TO PHQ QUESTIONS 1-9: 4
5. POOR APPETITE OR OVEREATING: NOT AT ALL

## 2021-07-08 ASSESSMENT — ANXIETY QUESTIONNAIRES
5. BEING SO RESTLESS THAT IT IS HARD TO SIT STILL: NOT AT ALL
2. NOT BEING ABLE TO STOP OR CONTROL WORRYING: NOT AT ALL
GAD7 TOTAL SCORE: 1
3. WORRYING TOO MUCH ABOUT DIFFERENT THINGS: NOT AT ALL
6. BECOMING EASILY ANNOYED OR IRRITABLE: SEVERAL DAYS
1. FEELING NERVOUS, ANXIOUS, OR ON EDGE: NOT AT ALL
7. FEELING AFRAID AS IF SOMETHING AWFUL MIGHT HAPPEN: NOT AT ALL

## 2021-07-08 NOTE — PROGRESS NOTES
David is a 20 year old who is being evaluated via a billable video visit.      How would you like to obtain your AVS? Lulihart  If the video visit is dropped, the invitation should be resent by: Text to cell phone: [phone] text invite  Will anyone else be joining your video visit? No      Video Start Time: 856    Assessment & Plan     ROYCE (generalized anxiety disorder)  Psychiatry consultation  Citalopram discontinued  Trial wellbutrin  Psychotherapy offered referral will consider.   Agreeable to seeing psychiatry.   Referral placed.   - MENTAL HEALTH REFERRAL  - Adult; Psychiatry; Psychiatry; [facility]: Collaborative Care Psychiatry Service/Bridge to Long-Term Psychiatry as indicated ([phone]); Yes; Chronic Mental Health without improvement; Yes; We will contact you to schedule ...  - buPROPion (WELLBUTRIN) 100 MG tablet; Take 1 tablet (100 mg) by mouth 2 times daily    Irritability and anger  - MENTAL HEALTH REFERRAL  - Adult; Psychiatry; Psychiatry; [facility]: Collaborative Care Psychiatry Service/Bridge to Long-Term Psychiatry as indicated ([phone]); Yes; Chronic Mental Health without improvement; Yes; We will contact you to schedule ...  - buPROPion (WELLBUTRIN) 100 MG tablet; Take 1 tablet (100 mg) by mouth 2 times daily      20 minutes spent on the date of the encounter doing chart review, history and exam, documentation and further activities per the note    Call or return to the clinic with any worsening of symptoms or no resolution. Patient/Parent verbalized understanding and is in agreement. Medication side effects reviewed.   Current Outpatient Medications   Medication Sig Dispense Refill     buPROPion (WELLBUTRIN) 100 MG tablet Take 1 tablet (100 mg) by mouth 2 times daily 60 tablet 0     cholecalciferol (VITAMIN D3) 5000 units TABS tablet Take 5,000 Units by mouth daily       melatonin 3 MG tablet Take 10 mg by mouth nightly as needed        Chart documentation with Dragon Voice recognition  Software. Although reviewed after completion, some words and grammatical errors may remain.  As the provider for this telephone/video service, I attest that I introduced myself to the patient, provided my credentials, disclosed my location, and determined that, based on a review of the patient's chart and/or a discussion with members of the patient's treatment team, a telephone/video visit is an appropriate and effective means of providing this service. The patient and I mutually agree that this visit is appropriate for telephone/video visit as well.  Follow up 1 month with myself or psychiatry team  EILEEN Phelps [facility]    Faith Hernandez is a 20 year old who presents for the following health issues     HPI   Chief Complaint   Patient presents with     Depression     Anxiety     Medication Reconciliation     quit taking all medication a few weeks ago     Depression and Anxiety Follow-Up    How are you doing with your depression since your last visit? Good, no depression    How are you doing with your anxiety since your last visit?  Not bad but feels angry    Are you having other symptoms that might be associated with depression or anxiety? Yes:  anger    Have you had a significant life event? No     Do you have any concerns with your use of alcohol or other drugs? No     Stopped taking citalopram a couple of weeks     Less anxious citalopram making more irritable.     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        Social History     Tobacco Use     Smoking status: Never Smoker     Smokeless tobacco: Current User     Types: Chew     Tobacco comment: Uses a vape   Substance Use Topics     Alcohol use: No     Drug use: No     PHQ 3/28/2019 7/30/2020 7/8/2021   PHQ-9 Total Score 4 4 4   Q9: Thoughts of better off dead/self-harm past 2 weeks Not at all Not at all Not at all     ROYCE-7 SCORE 2/17/2020 7/30/2020 7/8/2021   Total Score - - -   Total  Score 1 3 1     Last PHQ-9 7/8/2021   1.  Little interest or pleasure in doing things 0   2.  Feeling down, depressed, or hopeless 0   3.  Trouble falling or staying asleep, or sleeping too much 1   4.  Feeling tired or having little energy 1   5.  Poor appetite or overeating 2   6.  Feeling bad about yourself 0   7.  Trouble concentrating 0   8.  Moving slowly or restless 0   Q9: Thoughts of better off dead/self-harm past 2 weeks 0   PHQ-9 Total Score 4   Difficulty at work, home, or with people -     ROYCE-7  7/8/2021   1. Feeling nervous, anxious, or on edge 0   2. Not being able to stop or control worrying 0   3. Worrying too much about different things 0   4. Trouble relaxing 0   5. Being so restless that it is hard to sit still 0   6. Becoming easily annoyed or irritable 1   7. Feeling afraid, as if something awful might happen 0   ROYCE-7 Total Score 1   If you checked any problems, how difficult have they made it for you to do your work, take care of things at home, or get along with other people? -       Suicide Assessment Five-step Evaluation and Treatment (SAFE-T)      How many servings of fruits and vegetables do you eat daily?  0-1    On average, how many sweetened beverages do you drink each day (Examples: soda, juice, sweet tea, etc.  Do NOT count diet or artificially sweetened beverages)?   2    How many days per week do you exercise enough to make your heart beat faster? 3 or less    How many minutes a day do you exercise enough to make your heart beat faster? 9 or less  How many days per week do you miss taking your medication? 7  What makes it hard for you to take your medications?  side effects  Stopped taking medication a few weeks ago because felt more irritable and not himself      Review of Systems   Constitutional, HEENT, cardiovascular, pulmonary, GI, , musculoskeletal, neuro, skin, endocrine and psych systems are negative, except as otherwise noted.      Objective           Vitals:  No  vitals were obtained today due to virtual visit.    Physical Exam   GENERAL: Healthy, alert and no distress  EYES: Eyes grossly normal to inspection.  No discharge or erythema, or obvious scleral/conjunctival abnormalities.  RESP: No audible wheeze, cough, or visible cyanosis.  No visible retractions or increased work of breathing.    SKIN: Visible skin clear. No significant rash, abnormal pigmentation or lesions.  NEURO: Cranial nerves grossly intact.  Mentation and speech appropriate for age.  PSYCH: Mentation appears normal, affect normal/bright, judgement and insight intact, normal speech and appearance well-groomed.    Orders Only on 08/02/2020   Component Date Value Ref Range Status     COVID-19 Virus PCR to [facility] - So* 08/02/2020 Nasopharyngeal   Final     COVID-19 Virus PCR to [facility] - Re* 08/02/2020 Not Detected   Final    Comment: Collection of multiple specimens from the same patient may be necessary to   detect the virus. The possibility of a false negative should be considered if   the patient's recent exposure or clinical presentation suggests 2019 nCOV   infection and diagnostic tests for other causes of illness are negative.   Repeat testing may be considered in this setting.  Viral RNA was extracted via a validated method and subsequently underwent   single step reverse transcriptase-real time polymerase chain reaction using   primers to the CDC specified N1,N2 gene targets of CoV2 and human RNP as an   internal control.  A negative result does not rule out the presence of real-time PCR inhibitors   in the specimen or COVID-19 RNA in concentrations below the limit of detection   of the assay. The possibility of a false negative should be considered if the   patients recent exposure or clinical presentation suggests COVID-19.   Additional testing or repeat testing requires consultation with the   laborator                           y.  Nasopharyngeal specimen is the preferred choice for swab-based  SARS CoV2   testing. When collection of a nasopharyngeal swab is not possible the   following are acceptable alternatives:  an oropharyngeal (OP) specimen collected by a healthcare professional, or a   nasal mid-turbinate (NMT) swab collected by a healthcare professional or by   onsite self-collection (using a flocked tapered swab), or an anterior nares   specimen collected by a healthcare professional or by onsite self-collection   (using a round foam swab). (Centers for Disease Control)  Testing performed by [facility], Room 1[address]210, [address] [place] [address]. This test was developed and its   performance characteristics determined by the [facility]   [place]. It has not been cleared or approved by the FDA.  The laboratory is regulated under the Clinical Laboratory Improvement   Amendments of 1988 (CLIA-88) as qualified to perform high-complexity testin                           g.   This test is used for clinical purposes. It should not be regarded as   investigational or for research.                   Video-Visit Details    Type of service:  Video Visit    Video End Time:906    Originating Location (pt. Location): Home    Distant Location (provider location):  [facility]     Platform used for Video Visit: GigsTime

## 2021-07-09 ASSESSMENT — ANXIETY QUESTIONNAIRES: GAD7 TOTAL SCORE: 1

## 2021-07-31 ENCOUNTER — HEALTH MAINTENANCE LETTER (OUTPATIENT)
Age: 21
End: 2021-07-31

## 2021-09-25 ENCOUNTER — HEALTH MAINTENANCE LETTER (OUTPATIENT)
Age: 21
End: 2021-09-25

## 2022-01-19 ENCOUNTER — OFFICE VISIT (OUTPATIENT)
Dept: FAMILY MEDICINE | Facility: CLINIC | Age: 22
End: 2022-01-19
Payer: COMMERCIAL

## 2022-01-19 VITALS
BODY MASS INDEX: 19.7 KG/M2 | DIASTOLIC BLOOD PRESSURE: 72 MMHG | HEART RATE: 98 BPM | OXYGEN SATURATION: 98 % | HEIGHT: 69 IN | WEIGHT: 133 LBS | RESPIRATION RATE: 14 BRPM | SYSTOLIC BLOOD PRESSURE: 130 MMHG | TEMPERATURE: 97.3 F

## 2022-01-19 DIAGNOSIS — F39 MOOD DISORDER (H): ICD-10-CM

## 2022-01-19 DIAGNOSIS — L70.0 CYSTIC ACNE: ICD-10-CM

## 2022-01-19 DIAGNOSIS — R45.4 IRRITABILITY AND ANGER: ICD-10-CM

## 2022-01-19 DIAGNOSIS — F41.1 GAD (GENERALIZED ANXIETY DISORDER): Primary | ICD-10-CM

## 2022-01-19 LAB
ALBUMIN SERPL-MCNC: 4.5 G/DL (ref 3.4–5)
ALP SERPL-CCNC: 65 U/L (ref 40–150)
ALT SERPL W P-5'-P-CCNC: 17 U/L (ref 0–70)
ANION GAP SERPL CALCULATED.3IONS-SCNC: 4 MMOL/L (ref 3–14)
AST SERPL W P-5'-P-CCNC: 10 U/L (ref 0–45)
BASOPHILS # BLD AUTO: 0 10E3/UL (ref 0–0.2)
BASOPHILS NFR BLD AUTO: 0 %
BILIRUB SERPL-MCNC: 0.4 MG/DL (ref 0.2–1.3)
BUN SERPL-MCNC: 15 MG/DL (ref 7–30)
CALCIUM SERPL-MCNC: 9.6 MG/DL (ref 8.5–10.1)
CHLORIDE BLD-SCNC: 104 MMOL/L (ref 94–109)
CO2 SERPL-SCNC: 29 MMOL/L (ref 20–32)
CREAT SERPL-MCNC: 0.95 MG/DL (ref 0.66–1.25)
EOSINOPHIL # BLD AUTO: 0.1 10E3/UL (ref 0–0.7)
EOSINOPHIL NFR BLD AUTO: 2 %
ERYTHROCYTE [DISTWIDTH] IN BLOOD BY AUTOMATED COUNT: 12.5 % (ref 10–15)
GFR SERPL CREATININE-BSD FRML MDRD: >90 ML/MIN/1.73M2
GLUCOSE BLD-MCNC: 82 MG/DL (ref 70–99)
HCT VFR BLD AUTO: 42.9 % (ref 40–53)
HGB BLD-MCNC: 15.4 G/DL (ref 13.3–17.7)
LYMPHOCYTES # BLD AUTO: 1.5 10E3/UL (ref 0.8–5.3)
LYMPHOCYTES NFR BLD AUTO: 25 %
MCH RBC QN AUTO: 29.8 PG (ref 26.5–33)
MCHC RBC AUTO-ENTMCNC: 35.9 G/DL (ref 31.5–36.5)
MCV RBC AUTO: 83 FL (ref 78–100)
MONOCYTES # BLD AUTO: 0.7 10E3/UL (ref 0–1.3)
MONOCYTES NFR BLD AUTO: 11 %
NEUTROPHILS # BLD AUTO: 3.8 10E3/UL (ref 1.6–8.3)
NEUTROPHILS NFR BLD AUTO: 62 %
PLATELET # BLD AUTO: 234 10E3/UL (ref 150–450)
POTASSIUM BLD-SCNC: 4.7 MMOL/L (ref 3.4–5.3)
PROT SERPL-MCNC: 7.7 G/DL (ref 6.8–8.8)
RBC # BLD AUTO: 5.17 10E6/UL (ref 4.4–5.9)
SODIUM SERPL-SCNC: 137 MMOL/L (ref 133–144)
TSH SERPL DL<=0.005 MIU/L-ACNC: 1.73 MU/L (ref 0.4–4)
WBC # BLD AUTO: 6.1 10E3/UL (ref 4–11)

## 2022-01-19 PROCEDURE — 80050 GENERAL HEALTH PANEL: CPT | Performed by: NURSE PRACTITIONER

## 2022-01-19 PROCEDURE — 99214 OFFICE O/P EST MOD 30 MIN: CPT | Performed by: NURSE PRACTITIONER

## 2022-01-19 PROCEDURE — 36415 COLL VENOUS BLD VENIPUNCTURE: CPT | Performed by: NURSE PRACTITIONER

## 2022-01-19 RX ORDER — BUPROPION HYDROCHLORIDE 100 MG/1
100 TABLET ORAL 2 TIMES DAILY
Qty: 180 TABLET | Refills: 3 | Status: SHIPPED | OUTPATIENT
Start: 2022-01-19 | End: 2022-07-26

## 2022-01-19 ASSESSMENT — ANXIETY QUESTIONNAIRES
2. NOT BEING ABLE TO STOP OR CONTROL WORRYING: SEVERAL DAYS
6. BECOMING EASILY ANNOYED OR IRRITABLE: SEVERAL DAYS
4. TROUBLE RELAXING: NOT AT ALL
GAD7 TOTAL SCORE: 4
1. FEELING NERVOUS, ANXIOUS, OR ON EDGE: SEVERAL DAYS
7. FEELING AFRAID AS IF SOMETHING AWFUL MIGHT HAPPEN: NOT AT ALL
GAD7 TOTAL SCORE: 4
3. WORRYING TOO MUCH ABOUT DIFFERENT THINGS: SEVERAL DAYS
5. BEING SO RESTLESS THAT IT IS HARD TO SIT STILL: NOT AT ALL
7. FEELING AFRAID AS IF SOMETHING AWFUL MIGHT HAPPEN: NOT AT ALL
GAD7 TOTAL SCORE: 4

## 2022-01-19 ASSESSMENT — PATIENT HEALTH QUESTIONNAIRE - PHQ9
10. IF YOU CHECKED OFF ANY PROBLEMS, HOW DIFFICULT HAVE THESE PROBLEMS MADE IT FOR YOU TO DO YOUR WORK, TAKE CARE OF THINGS AT HOME, OR GET ALONG WITH OTHER PEOPLE: SOMEWHAT DIFFICULT
SUM OF ALL RESPONSES TO PHQ QUESTIONS 1-9: 4
SUM OF ALL RESPONSES TO PHQ QUESTIONS 1-9: 4

## 2022-01-19 ASSESSMENT — MIFFLIN-ST. JEOR: SCORE: 1598.66

## 2022-01-19 ASSESSMENT — PAIN SCALES - GENERAL: PAINLEVEL: NO PAIN (0)

## 2022-01-19 NOTE — PROGRESS NOTES
Assessment & Plan     ROYCE (generalized anxiety disorder)  Consider adult ADD in the differential  Labs done today.  Restart Wellbutrin.  100 mg twice daily  - CBC with platelets and differential  - Comprehensive metabolic panel (BMP + Alb, Alk Phos, ALT, AST, Total. Bili, TP)  - buPROPion (WELLBUTRIN) 100 MG tablet  Dispense: 180 tablet; Refill: 3  - TSH with free T4 reflex    Irritability and anger  Improved on Wellbutrin.  Restart.  - buPROPion (WELLBUTRIN) 100 MG tablet  Dispense: 180 tablet; Refill: 3    Cystic acne  Return to see dermatology as discussed restart of Accutane  Labs done today  - Adult Dermatology Referral    Mood disorder (H)  Improved on current meds.  Continue      Call or return to the clinic with any worsening of symptoms or no resolution. Patient/Parent verbalized understanding and is in agreement. Medication side effects reviewed.   Current Outpatient Medications   Medication Sig Dispense Refill     buPROPion (WELLBUTRIN) 100 MG tablet Take 1 tablet (100 mg) by mouth 2 times daily 180 tablet 3     cholecalciferol (VITAMIN D3) 5000 units TABS tablet Take 5,000 Units by mouth daily       melatonin 3 MG tablet Take 10 mg by mouth nightly as needed        Chart documentation with Dragon Voice recognition Software. Although reviewed after completion, some words and grammatical errors may remain.    EILEEN Phelps CNP  [facility]    Faith Hernandez is a 21 year old who presents for the following health issues     HPI     Depression-generalized anxiety, irritability, anger follow up  Was on Wellbutrin to quit smoking- then quit and stopped taking the Wellbutrin and noticed it has helped with moods   Wants refill   Patient states his anxiety probably started during his elementary school years  Difficulty in school.  Difficulty with concentration.  Sleep-insomnia-has been poor since early childhood    How are you doing with your depression since your last  visit? Improved with wellbutrin 100mg     Are you having other symptoms that might be associated with depression? No    Have you had a significant life event?  No     Are you feeling anxious or having panic attacks?   No    Do you have any concerns with your use of alcohol or other drugs? No     Denies any drug or alcohol use.    Social History     Tobacco Use     Smoking status: Never Smoker     Smokeless tobacco: Current User     Types: Chew     Tobacco comment: Uses a vape   Substance Use Topics     Alcohol use: No     Drug use: No     PHQ 7/30/2020 7/8/2021 1/19/2022   PHQ-9 Total Score 4 4 4   Q9: Thoughts of better off dead/self-harm past 2 weeks Not at all Not at all Not at all     ROYCE-7 SCORE 7/30/2020 7/8/2021 1/19/2022   Total Score - - 4 (minimal anxiety)   Total Score 3 1 4     Last PHQ-9 1/19/2022   1.  Little interest or pleasure in doing things 1   2.  Feeling down, depressed, or hopeless 1   3.  Trouble falling or staying asleep, or sleeping too much 1   4.  Feeling tired or having little energy 1   5.  Poor appetite or overeating 0   6.  Feeling bad about yourself 0   7.  Trouble concentrating 0   8.  Moving slowly or restless 0   Q9: Thoughts of better off dead/self-harm past 2 weeks 0   PHQ-9 Total Score 4   Difficulty at work, home, or with people -     ROYCE-7  1/19/2022   1. Feeling nervous, anxious, or on edge 1   2. Not being able to stop or control worrying 1   3. Worrying too much about different things 1   4. Trouble relaxing 0   5. Being so restless that it is hard to sit still 0   6. Becoming easily annoyed or irritable 1   7. Feeling afraid, as if something awful might happen 0   ROYCE-7 Total Score 4   If you checked any problems, how difficult have they made it for you to do your work, take care of things at home, or get along with other people? -     Exacerbation of his acne again after going off of Accutane  Would like to go back on.  Would like referral to dermatology to  "discuss          Review of Systems   Constitutional, HEENT, cardiovascular, pulmonary, GI, , musculoskeletal, neuro, skin, endocrine and psych systems are negative, except as otherwise noted.      Objective    /72   Pulse 98   Temp 97.3  F (36.3  C) (Tympanic)   Resp 14   Ht 1.753 m (5' 9\")   Wt 60.3 kg (133 lb)   SpO2 98%   BMI 19.64 kg/m    Body mass index is 19.64 kg/m .  Physical Exam   GENERAL: healthy, alert and no distress  EYES: Eyes grossly normal to inspection, PERRL and conjunctivae and sclerae normal  HENT: ear canals and TM's normal, nose and mouth without ulcers or lesions  NECK: no adenopathy, no asymmetry, masses, or scars and thyroid normal to palpation  RESP: lungs clear to auscultation - no rales, rhonchi or wheezes  CV: regular rate and rhythm, normal S1 S2, no S3 or S4, no murmur, click or rub, no peripheral edema and peripheral pulses strong  ABDOMEN: soft, nontender, no hepatosplenomegaly, no masses and bowel sounds normal  MS: no gross musculoskeletal defects noted, no edema  SKIN: Scarring cystic acne to the cheeks bilaterally  NEURO: Normal strength and tone, mentation intact and speech normal  PSYCH: mentation appears normal, affect normal/bright    Orders Only on 08/02/2020   Component Date Value Ref Range Status     COVID-19 Virus PCR to [facility] - So* 08/02/2020 Nasopharyngeal   Final     COVID-19 Virus PCR to [facility] - Re* 08/02/2020 Not Detected   Final    Comment: Collection of multiple specimens from the same patient may be necessary to   detect the virus. The possibility of a false negative should be considered if   the patient's recent exposure or clinical presentation suggests 2019 nCOV   infection and diagnostic tests for other causes of illness are negative.   Repeat testing may be considered in this setting.  Viral RNA was extracted via a validated method and subsequently underwent   single step reverse transcriptase-real time polymerase chain reaction using "   primers to the [facility] specified N1,N2 gene targets of CoV2 and human RNP as an   internal control.  A negative result does not rule out the presence of real-time PCR inhibitors   in the specimen or COVID-19 RNA in concentrations below the limit of detection   of the assay. The possibility of a false negative should be considered if the   patients recent exposure or clinical presentation suggests COVID-19.   Additional testing or repeat testing requires consultation with the   laborator                           y.  Nasopharyngeal specimen is the preferred choice for swab-based SARS CoV2   testing. When collection of a nasopharyngeal swab is not possible the   following are acceptable alternatives:  an oropharyngeal (OP) specimen collected by a healthcare professional, or a   nasal mid-turbinate (NMT) swab collected by a healthcare professional or by   onsite self-collection (using a flocked tapered swab), or an anterior nares   specimen collected by a healthcare professional or by onsite self-collection   (using a round foam swab). (Centers for Disease Control)  Testing performed by [facility], Room 1[address]210, [address] [place] [address]. This test was developed and its   performance characteristics determined by the [facility] [employer]   [place]. It has not been cleared or approved by the FDA.  The laboratory is regulated under the Clinical Laboratory Improvement   Amendments of 1988 (CLIA-88) as qualified to perform high-complexity testin                           g.   This test is used for clinical purposes. It should not be regarded as   investigational or for research.                   Answers for HPI/ROS submitted by the patient on 1/19/2022  If you checked off any problems, how difficult have these problems made it for you to do your work, take care of things at home, or get along with other people?: Somewhat difficult  PHQ9 TOTAL SCORE: 4  ROYCE 7 TOTAL  SCORE: 4

## 2022-01-20 ASSESSMENT — ANXIETY QUESTIONNAIRES: GAD7 TOTAL SCORE: 4

## 2022-01-20 ASSESSMENT — PATIENT HEALTH QUESTIONNAIRE - PHQ9: SUM OF ALL RESPONSES TO PHQ QUESTIONS 1-9: 4

## 2022-03-25 ENCOUNTER — OFFICE VISIT (OUTPATIENT)
Dept: DERMATOLOGY | Facility: CLINIC | Age: 22
End: 2022-03-25
Payer: COMMERCIAL

## 2022-03-25 VITALS — DIASTOLIC BLOOD PRESSURE: 67 MMHG | HEART RATE: 85 BPM | SYSTOLIC BLOOD PRESSURE: 115 MMHG | OXYGEN SATURATION: 98 %

## 2022-03-25 DIAGNOSIS — L70.0 CYSTIC ACNE: ICD-10-CM

## 2022-03-25 DIAGNOSIS — L70.0 ACNE VULGARIS: Primary | ICD-10-CM

## 2022-03-25 LAB
ALBUMIN SERPL-MCNC: 4 G/DL (ref 3.4–5)
ALP SERPL-CCNC: 74 U/L (ref 40–150)
ALT SERPL W P-5'-P-CCNC: 59 U/L (ref 0–70)
ANION GAP SERPL CALCULATED.3IONS-SCNC: 6 MMOL/L (ref 3–14)
AST SERPL W P-5'-P-CCNC: 51 U/L (ref 0–45)
BILIRUB SERPL-MCNC: 0.3 MG/DL (ref 0.2–1.3)
BUN SERPL-MCNC: 20 MG/DL (ref 7–30)
CALCIUM SERPL-MCNC: 9.7 MG/DL (ref 8.5–10.1)
CHLORIDE BLD-SCNC: 104 MMOL/L (ref 94–109)
CHOLEST SERPL-MCNC: 127 MG/DL
CO2 SERPL-SCNC: 28 MMOL/L (ref 20–32)
CREAT SERPL-MCNC: 0.92 MG/DL (ref 0.66–1.25)
ERYTHROCYTE [DISTWIDTH] IN BLOOD BY AUTOMATED COUNT: 12.8 % (ref 10–15)
FASTING STATUS PATIENT QL REPORTED: YES
GFR SERPL CREATININE-BSD FRML MDRD: >90 ML/MIN/1.73M2
GLUCOSE BLD-MCNC: 78 MG/DL (ref 70–99)
HCT VFR BLD AUTO: 43.6 % (ref 40–53)
HDLC SERPL-MCNC: 52 MG/DL
HGB BLD-MCNC: 14.5 G/DL (ref 13.3–17.7)
LDLC SERPL CALC-MCNC: 55 MG/DL
MCH RBC QN AUTO: 29.5 PG (ref 26.5–33)
MCHC RBC AUTO-ENTMCNC: 33.3 G/DL (ref 31.5–36.5)
MCV RBC AUTO: 89 FL (ref 78–100)
NONHDLC SERPL-MCNC: 75 MG/DL
PLATELET # BLD AUTO: 239 10E3/UL (ref 150–450)
POTASSIUM BLD-SCNC: 4.1 MMOL/L (ref 3.4–5.3)
PROT SERPL-MCNC: 7.1 G/DL (ref 6.8–8.8)
RBC # BLD AUTO: 4.91 10E6/UL (ref 4.4–5.9)
SODIUM SERPL-SCNC: 138 MMOL/L (ref 133–144)
TRIGL SERPL-MCNC: 98 MG/DL
WBC # BLD AUTO: 6.6 10E3/UL (ref 4–11)

## 2022-03-25 PROCEDURE — 80053 COMPREHEN METABOLIC PANEL: CPT | Performed by: PHYSICIAN ASSISTANT

## 2022-03-25 PROCEDURE — 80061 LIPID PANEL: CPT | Performed by: PHYSICIAN ASSISTANT

## 2022-03-25 PROCEDURE — 99213 OFFICE O/P EST LOW 20 MIN: CPT | Performed by: PHYSICIAN ASSISTANT

## 2022-03-25 PROCEDURE — 85027 COMPLETE CBC AUTOMATED: CPT | Performed by: PHYSICIAN ASSISTANT

## 2022-03-25 PROCEDURE — 36415 COLL VENOUS BLD VENIPUNCTURE: CPT | Performed by: PHYSICIAN ASSISTANT

## 2022-03-25 RX ORDER — ISOTRETINOIN 40 MG/1
40 CAPSULE ORAL
Qty: 30 CAPSULE | Refills: 0 | Status: SHIPPED | OUTPATIENT
Start: 2022-03-25 | End: 2024-02-27

## 2022-03-25 NOTE — NURSING NOTE
Chief Complaint   Patient presents with     Acne     face and shoulder        Vitals:    03/25/22 1129   BP: 115/67   BP Location: Right arm   Patient Position: Sitting   Cuff Size: Adult Regular   Pulse: 85   SpO2: 98%     Wt Readings from Last 1 Encounters:   01/19/22 60.3 kg (133 lb)       Nela Daley LPN .................3/25/2022

## 2022-03-25 NOTE — LETTER
3/25/2022         RE: David Dejesus  [address] [facility] [id number]        Dear Colleague,    Thank you for referring your patient, David Dejesus, to the [facility]. Please see a copy of my visit note below.    David Dejesus is an extremely pleasant 21 year old year old male patient here today for recheck acne vulgaris. Finished isotretinoin in 2019. He notes recently acne started to return. He would like to restart isotretinoin to ensure acne is gone. He wellbutrin which has controlled his mood. He denies any side effects with first round.  Patient has no other skin complaints today.  Remainder of the HPI, Meds, PMH, Allergies, FH, and SH was reviewed in chart.    Pertinent Hx:   Acne Vulgaris   No past medical history on file.    No past surgical history on file.     No family history on file.    Social History     Socioeconomic History     Marital status: Single     Spouse name: Not on file     Number of children: Not on file     Years of education: Not on file     Highest education level: Not on file   Occupational History     Not on file   Tobacco Use     Smoking status: Never Smoker     Smokeless tobacco: Current User     Types: Chew     Tobacco comment: Uses a vape   Substance and Sexual Activity     Alcohol use: No     Drug use: No     Sexual activity: Never   Other Topics Concern     Not on file   Social History Narrative     Not on file     Social Determinants of Health     Financial Resource Strain: Not on file   Food Insecurity: Not on file   Transportation Needs: Not on file   Physical Activity: Not on file   Stress: Not on file   Social Connections: Not on file   Intimate Partner Violence: Not on file   Housing Stability: Not on file       Outpatient Encounter Medications as of 3/25/2022   Medication Sig Dispense Refill     buPROPion (WELLBUTRIN) 100 MG tablet Take 1 tablet (100 mg) by mouth 2 times daily 180 tablet 3     ISOtretinoin (ACCUTANE) 40 MG capsule Take 1  capsule (40 mg) by mouth daily with food 30 capsule 0     melatonin 3 MG tablet Take 10 mg by mouth nightly as needed        cholecalciferol (VITAMIN D3) 5000 units TABS tablet Take 5,000 Units by mouth daily (Patient not taking: Reported on 3/25/2022)       No facility-administered encounter medications on file as of 3/25/2022.             O:   NAD, WDWN, Alert & Oriented, Mood & Affect wnl, Vitals stable   Here today alone   /67 (BP Location: Right arm, Patient Position: Sitting, Cuff Size: Adult Regular)   Pulse 85   SpO2 98%    General appearance normal   Vitals stable   Alert, oriented and in no acute distress     1+ Inflammatory papules on face, shoulders, pih on face and shoudlers    Eyes: Conjunctivae/lids:Normal     ENT: Lips: normal    MSK:[place]    Pulm: Breathing Normal    Neuro/Psych: Orientation:Alert and Orientedx3 ; Mood/Affect:normal   A/P:  1. Acne Vulgaris  He is aware that this can affect his mood, he will monitor closely.   Start 40 mg daily with food.   Standing CBC, CMP and fasting lipids  Ipledge reviewed with patient and Ipledge consent form complete  Patient place in ipledge system  Ipledge: [phone]  Return to clinic 30 days  Dry lips and mouth, minor swelling of the eyelids or lips, crusty skin, nosebleeds, GI upset, or thinning of hair may occur. If any of these effects persist or worsen, tell your doctor or pharmacist promptly.   To relieve dry mouth, suck on (sugarless) hard candy or ice chips, chew (sugarless) gum, drink water.   Remember that your doctor has prescribed this medication because he or she has judged that the benefit to you is greater than the risk of side effects. Many people using this medication do not have serious side effects.   Contact office immediately if you have any of these unlikely but serious side effects: mental/mood changes (e.g., depression,  aggressive or violent behavior, and in rare cases, thoughts of suicide), tingling feeling in the skin,  quick/severe sun sensitivity, back/joint/muscle pain, signs of infection (e.g., fever, persistent sore throat, painful swallowing, peeling skin on palms/soles.   Isotretinoin may infrequently cause disease of the pancreatitis, that may rarely be fatal. Stop taking this medication and contact office immediately if you develop: severe stomach pain severe or persistent GI upset,   Stop taking this medication and tell your doctor immediately if you develop these unlikely but very serious side effects: severe headache, vision changes, ear ringing, hearling loss, chest pain, yellowing eyes, skin, dark urine, severe diarrhea, rectal bleeding,   Seek immediate medical attention if you notice any symptoms of a serious allergic reaction.     Accutane is discussed fully with the patient. It is a very effective drug to treat acne vulgaris but has many potential significant side effects. Chief among these are teratogensis, hepatic injury, dyslipidemia and severe drying of the mucous membranes. All of these issues have been discussed in details. Monthly blood tests to monitor lipids and liver functions will be necessary. Expect painful dryness and/or fissuring around the lips, eyes, and other moist areas of the body. Balms may be protective. Contact lens may be too painful to wear temporarily while on this drug. Episodes of significant depression have been reported, including suicidal ideation and attempts in rare cases. It may also cause pseudotumor cerebri and hyperostosis. The patient will report any such changes in mood, depressive symptoms or suicidal thoughts, headaches, joint or bone pains. There is also a possible association with inflammatory bowel disease, although this is unproven at this point.         Again, thank you for allowing me to participate in the care of your patient.        Sincerely,        Bridget Willis PA-C

## 2022-03-25 NOTE — LETTER
April 4, 2022      David Dejesus  [address] [facility] [address]        Dear ,    We are writing to inform you of your test results.    Your blood work is ok. Liver test was slightly elevated. Any recent alcohol or tylenol? Please let me know by calling [phone] and follow prompts to the specialty clinic.      Resulted Orders   Lipid panel reflex to direct LDL Fasting   Result Value Ref Range    Cholesterol 127 <200 mg/dL    Triglycerides 98 <150 mg/dL    Direct Measure HDL 52 >=40 mg/dL    LDL Cholesterol Calculated 55 <=100 mg/dL    Non HDL Cholesterol 75 <130 mg/dL    Patient Fasting > 8hrs? Yes     Narrative    Cholesterol  Desirable:  <200 mg/dL    Triglycerides  Normal:  Less than 150 mg/dL  Borderline High:  150-199 mg/dL  High:  200-499 mg/dL  Very High:  Greater than or equal to 500 mg/dL    Direct Measure HDL  Female:  Greater than or equal to 50 mg/dL   Male:  Greater than or equal to 40 mg/dL    LDL Cholesterol  Desirable:  <100mg/dL  Above Desirable:  100-129 mg/dL   Borderline High:  130-159 mg/dL   High:  160-189 mg/dL   Very High:  >= 190 mg/dL    Non HDL Cholesterol  Desirable:  130 mg/dL  Above Desirable:  130-159 mg/dL  Borderline High:  160-189 mg/dL  High:  190-219 mg/dL  Very High:  Greater than or equal to 220 mg/dL   CBC with platelets   Result Value Ref Range    WBC Count 6.6 4.0 - 11.0 10e3/uL    RBC Count 4.91 4.40 - 5.90 10e6/uL    Hemoglobin 14.5 13.3 - 17.7 g/dL    Hematocrit 43.6 40.0 - 53.0 %    MCV 89 78 - 100 fL    MCH 29.5 26.5 - 33.0 pg    MCHC 33.3 31.5 - 36.5 g/dL    RDW 12.8 10.0 - 15.0 %    Platelet Count 239 150 - 450 10e3/uL   Comprehensive metabolic panel   Result Value Ref Range    Sodium 138 133 - 144 mmol/L    Potassium 4.1 3.4 - 5.3 mmol/L    Chloride 104 94 - 109 mmol/L    Carbon Dioxide (CO2) 28 20 - 32 mmol/L    Anion Gap 6 3 - 14 mmol/L    Urea Nitrogen 20 7 - 30 mg/dL    Creatinine 0.92 0.66 - 1.25 mg/dL    Calcium 9.7 8.5 - 10.1 mg/dL     Glucose 78 70 - 99 mg/dL    Alkaline Phosphatase 74 40 - 150 U/L    AST 51 (H) 0 - 45 U/L    ALT 59 0 - 70 U/L    Protein Total 7.1 6.8 - 8.8 g/dL    Albumin 4.0 3.4 - 5.0 g/dL    Bilirubin Total 0.3 0.2 - 1.3 mg/dL    GFR Estimate >90 >60 mL/min/1.73m2      Comment:      Effective December 21, 2021 eGFRcr in adults is calculated using the 2021 CKD-EPI creatinine equation which includes age and gender ( et al., NEJM, DOI: 10.1056/JSQCmd3534085)       If you have any questions or concerns, please call the clinic at the number listed above.       Sincerely,      Bridget Luna PA-C

## 2022-03-25 NOTE — PROGRESS NOTES
David Dejesus is an extremely pleasant 21 year old year old male patient here today for recheck acne vulgaris. Finished isotretinoin in 2019. He notes recently acne started to return. He would like to restart isotretinoin to ensure acne is gone. He wellbutrin which has controlled his mood. He denies any side effects with first round.  Patient has no other skin complaints today.  Remainder of the HPI, Meds, PMH, Allergies, FH, and SH was reviewed in chart.    Pertinent Hx:   Acne Vulgaris   No past medical history on file.    No past surgical history on file.     No family history on file.    Social History     Socioeconomic History     Marital status: Single     Spouse name: Not on file     Number of children: Not on file     Years of education: Not on file     Highest education level: Not on file   Occupational History     Not on file   Tobacco Use     Smoking status: Never Smoker     Smokeless tobacco: Current User     Types: Chew     Tobacco comment: Uses a vape   Substance and Sexual Activity     Alcohol use: No     Drug use: No     Sexual activity: Never   Other Topics Concern     Not on file   Social History Narrative     Not on file     Social Determinants of Health     Financial Resource Strain: Not on file   Food Insecurity: Not on file   Transportation Needs: Not on file   Physical Activity: Not on file   Stress: Not on file   Social Connections: Not on file   Intimate Partner Violence: Not on file   Housing Stability: Not on file       Outpatient Encounter Medications as of 3/25/2022   Medication Sig Dispense Refill     buPROPion (WELLBUTRIN) 100 MG tablet Take 1 tablet (100 mg) by mouth 2 times daily 180 tablet 3     ISOtretinoin (ACCUTANE) 40 MG capsule Take 1 capsule (40 mg) by mouth daily with food 30 capsule 0     melatonin 3 MG tablet Take 10 mg by mouth nightly as needed        cholecalciferol (VITAMIN D3) 5000 units TABS tablet Take 5,000 Units by mouth daily (Patient not taking: Reported on  3/25/2022)       No facility-administered encounter medications on file as of 3/25/2022.             O:   NAD, WDWN, Alert & Oriented, Mood & Affect wnl, Vitals stable   Here today alone   /67 (BP Location: Right arm, Patient Position: Sitting, Cuff Size: Adult Regular)   Pulse 85   SpO2 98%    General appearance normal   Vitals stable   Alert, oriented and in no acute distress     1+ Inflammatory papules on face, shoulders, pih on face and shoudlers    Eyes: Conjunctivae/lids:Normal     ENT: Lips: normal    MSK:[place]    Pulm: Breathing Normal    Neuro/Psych: Orientation:Alert and Orientedx3 ; Mood/Affect:normal   A/P:  1. Acne Vulgaris  He is aware that this can affect his mood, he will monitor closely.   Start 40 mg daily with food.   Standing CBC, CMP and fasting lipids  Ipledge reviewed with patient and Ipledge consent form complete  Patient place in ipledge system  Ipledge: [id number]  Return to clinic 30 days  Dry lips and mouth, minor swelling of the eyelids or lips, crusty skin, nosebleeds, GI upset, or thinning of hair may occur. If any of these effects persist or worsen, tell your doctor or pharmacist promptly.   To relieve dry mouth, suck on (sugarless) hard candy or ice chips, chew (sugarless) gum, drink water.   Remember that your doctor has prescribed this medication because he or she has judged that the benefit to you is greater than the risk of side effects. Many people using this medication do not have serious side effects.   Contact office immediately if you have any of these unlikely but serious side effects: mental/mood changes (e.g., depression,  aggressive or violent behavior, and in rare cases, thoughts of suicide), tingling feeling in the skin, quick/severe sun sensitivity, back/joint/muscle pain, signs of infection (e.g., fever, persistent sore throat, painful swallowing, peeling skin on palms/soles.   Isotretinoin may infrequently cause disease of the pancreatitis, that may rarely  be fatal. Stop taking this medication and contact office immediately if you develop: severe stomach pain severe or persistent GI upset,   Stop taking this medication and tell your doctor immediately if you develop these unlikely but very serious side effects: severe headache, vision changes, ear ringing, hearling loss, chest pain, yellowing eyes, skin, dark urine, severe diarrhea, rectal bleeding,   Seek immediate medical attention if you notice any symptoms of a serious allergic reaction.     Accutane is discussed fully with the patient. It is a very effective drug to treat acne vulgaris but has many potential significant side effects. Chief among these are teratogensis, hepatic injury, dyslipidemia and severe drying of the mucous membranes. All of these issues have been discussed in details. Monthly blood tests to monitor lipids and liver functions will be necessary. Expect painful dryness and/or fissuring around the lips, eyes, and other moist areas of the body. Balms may be protective. Contact lens may be too painful to wear temporarily while on this drug. Episodes of significant depression have been reported, including suicidal ideation and attempts in rare cases. It may also cause pseudotumor cerebri and hyperostosis. The patient will report any such changes in mood, depressive symptoms or suicidal thoughts, headaches, joint or bone pains. There is also a possible association with inflammatory bowel disease, although this is unproven at this point.

## 2022-05-26 ENCOUNTER — TELEPHONE (OUTPATIENT)
Dept: DERMATOLOGY | Facility: CLINIC | Age: 22
End: 2022-05-26
Payer: COMMERCIAL

## 2022-05-26 ENCOUNTER — TELEPHONE (OUTPATIENT)
Dept: DERMATOLOGY | Facility: CLINIC | Age: 22
End: 2022-05-26

## 2022-05-26 NOTE — TELEPHONE ENCOUNTER
Called patient. No answer. Left message to see if his appointment was supposed to be virtual or not. Call back to let us know if he will be continuing or to cancel the rest of his appointments. Clinic number was provided.     Nela Daley LPN

## 2022-05-26 NOTE — TELEPHONE ENCOUNTER
Pt has no showed his last two scheduled appointments with the Dermatology clinic.    At Bridget Luna's request Pt was contacted by phone to no answer and VM was left.    Left VM stating that due to Pt No Showing his last two visits with the Dermatology clinic we will be canceling his future appointments as of 3 pm today.     Pt was instructed to contact the clinic before 3 pm today if he would like to keep his future scheduled appointments with the dermatology clinic.    Also stated that he does not plan on continuing his current treatment plan, he can cancel his future appointments on Mychart.      I notified Pt that I will also send this information to him on Mychart.    Gissel STORY,  CMA

## 2022-05-26 NOTE — LETTER
May 26, 2022      David Dejesus  [address] [facility] [address]        David,      We are writing to notify you that due to your last two missed appointments with the Dermatology clinic and lack or response if you would like to continue your current treatment plan, your future appointments have been canceled.     If you would like to continue the current treatment plan please call [phone] or use [facility] to schedule an appointment.        Sincerely,        Bridget Willis PA-C/Gissel STORY,  CMA

## 2022-07-26 ENCOUNTER — VIRTUAL VISIT (OUTPATIENT)
Dept: FAMILY MEDICINE | Facility: CLINIC | Age: 22
End: 2022-07-26
Payer: COMMERCIAL

## 2022-07-26 DIAGNOSIS — F41.1 GAD (GENERALIZED ANXIETY DISORDER): ICD-10-CM

## 2022-07-26 DIAGNOSIS — R45.4 IRRITABILITY AND ANGER: ICD-10-CM

## 2022-07-26 PROCEDURE — 99214 OFFICE O/P EST MOD 30 MIN: CPT | Mod: 95 | Performed by: NURSE PRACTITIONER

## 2022-07-26 RX ORDER — BUPROPION HYDROCHLORIDE 100 MG/1
TABLET ORAL
Qty: 225 TABLET | Refills: 1 | Status: SHIPPED | OUTPATIENT
Start: 2022-07-26 | End: 2024-02-11

## 2022-07-26 ASSESSMENT — PATIENT HEALTH QUESTIONNAIRE - PHQ9
5. POOR APPETITE OR OVEREATING: SEVERAL DAYS
SUM OF ALL RESPONSES TO PHQ QUESTIONS 1-9: 5

## 2022-07-26 ASSESSMENT — ANXIETY QUESTIONNAIRES
2. NOT BEING ABLE TO STOP OR CONTROL WORRYING: NOT AT ALL
5. BEING SO RESTLESS THAT IT IS HARD TO SIT STILL: SEVERAL DAYS
IF YOU CHECKED OFF ANY PROBLEMS ON THIS QUESTIONNAIRE, HOW DIFFICULT HAVE THESE PROBLEMS MADE IT FOR YOU TO DO YOUR WORK, TAKE CARE OF THINGS AT HOME, OR GET ALONG WITH OTHER PEOPLE: NOT DIFFICULT AT ALL
3. WORRYING TOO MUCH ABOUT DIFFERENT THINGS: NOT AT ALL
GAD7 TOTAL SCORE: 5
1. FEELING NERVOUS, ANXIOUS, OR ON EDGE: SEVERAL DAYS
GAD7 TOTAL SCORE: 5
7. FEELING AFRAID AS IF SOMETHING AWFUL MIGHT HAPPEN: SEVERAL DAYS
6. BECOMING EASILY ANNOYED OR IRRITABLE: SEVERAL DAYS

## 2022-07-26 NOTE — PROGRESS NOTES
David is a 21 year old who is being evaluated via a billable video visit.      How would you like to obtain your AVS? MyChart  If the video visit is dropped, the invitation should be resent by: Text to cell phone: [phone]  Will anyone else be joining your video visit? No          Assessment & Plan     ROYCE (generalized anxiety disorder)  Chronic, still having difficulty with anxiety, irritability and anger.  Most recently restarted on Wellbutrin in January, does notice some improvement, however feels he would like to have further evaluation by psychiatry.  Referral placed for patient, he should be receiving a phone call to schedule.  Other outside resources also provided in patient's AVS.  Discussed with patient in the interim increasing dosage of Wellbutrin, patient agreeable.  We will have patient increase 250 mg (1-1/2 tabs) in the morning and 100 mg (1 tab) in the evening.  Patient agreeable.  - buPROPion (WELLBUTRIN) 100 MG tablet; Take 1.5 tablets (150 mg) by mouth every morning AND 1 tablet (100 mg) every evening.  - Adult Mental Health  Referral; Future    Irritability and anger  Increased irritability, anger and anxiety as above.  - buPROPion (WELLBUTRIN) 100 MG tablet; Take 1.5 tablets (150 mg) by mouth every morning AND 1 tablet (100 mg) every evening.  - Adult Mental Health  Referral; Future             Nicotine/Tobacco Cessation:  He reports that he has never smoked. His smokeless tobacco use includes chew.  Nicotine/Tobacco Cessation Plan:   Not discussed      See Patient Instructions    Return in about 3 months (around 10/26/2022) for Recheck.    Neris Schroeder, HERNANDEZ, APRN-CNP   [facility]    Subjective   David is a 21 year old, presenting for the following health issues:  Referral      HPI     Depression and Anxiety Follow-Up    How are you doing with your depression since your last visit? Worsened     How are you doing with your anxiety since your last  visit?  Worsened    Are you having other symptoms that might be associated with depression or anxiety? No    Have you had a significant life event? No     Do you have any concerns with your use of alcohol or other drugs? No    Restarted Wellbutrin in January  Is more social since starting, but feels still irritable and angry at times   Was on Celexa previously   Hasn't seen counselor for     Social History     Tobacco Use     Smoking status: Never Smoker     Smokeless tobacco: Current User     Types: Chew     Tobacco comment: Uses a vape   Substance Use Topics     Alcohol use: No     Drug use: No     PHQ 7/8/2021 1/19/2022 7/26/2022   PHQ-9 Total Score 4 4 5   Q9: Thoughts of better off dead/self-harm past 2 weeks Not at all Not at all Not at all     ROYCE-7 SCORE 7/8/2021 1/19/2022 7/26/2022   Total Score - 4 (minimal anxiety) -   Total Score 1 4 5     Last PHQ-9 7/26/2022   1.  Little interest or pleasure in doing things 1   2.  Feeling down, depressed, or hopeless 0   3.  Trouble falling or staying asleep, or sleeping too much 1   4.  Feeling tired or having little energy 1   5.  Poor appetite or overeating 1   6.  Feeling bad about yourself 0   7.  Trouble concentrating 1   8.  Moving slowly or restless 0   Q9: Thoughts of better off dead/self-harm past 2 weeks 0   PHQ-9 Total Score 5   Difficulty at work, home, or with people -     ROYCE-7  7/26/2022   1. Feeling nervous, anxious, or on edge 1   2. Not being able to stop or control worrying 0   3. Worrying too much about different things 0   4. Trouble relaxing 1   5. Being so restless that it is hard to sit still 1   6. Becoming easily annoyed or irritable 1   7. Feeling afraid, as if something awful might happen 1   ROYCE-7 Total Score 5   If you checked any problems, how difficult have they made it for you to do your work, take care of things at home, or get along with other people? Not difficult at all         Review of Systems   Constitutional, HEENT,  cardiovascular, pulmonary, gi and gu systems are negative, except as otherwise noted.      Objective           Vitals:  No vitals were obtained today due to virtual visit.    Physical Exam   GENERAL: Healthy, alert and no distress  EYES: Eyes grossly normal to inspection.  No discharge or erythema, or obvious scleral/conjunctival abnormalities.  RESP: No audible wheeze, cough, or visible cyanosis.  No visible retractions or increased work of breathing.    SKIN: Visible skin clear. No significant rash, abnormal pigmentation or lesions.  NEURO: Cranial nerves grossly intact.  Mentation and speech appropriate for age.  PSYCH: Mentation appears normal, affect normal/bright, judgement and insight intact, normal speech and appearance well-groomed.    Diagnostic Test Results:  none            Video-Visit Details    Video Start Time: 12:11 PM    Type of service:  Video Visit    Video End Time:12:21 PM    Originating Location (pt. Location): Home    Distant Location (provider location):  [facility]     Platform used for Video Visit: [employer]  Constance.

## 2022-07-26 NOTE — PATIENT INSTRUCTIONS
ROYCE (generalized anxiety disorder)  Chronic, still having difficulty with anxiety, irritability and anger.  Most recently restarted on Wellbutrin in January, does notice some improvement, however feels he would like to have further evaluation by psychiatry.  Referral placed for patient, he should be receiving a phone call to schedule.  Other outside resources also provided in patient's AVS.  Discussed with patient in the interim increasing dosage of Wellbutrin, patient agreeable.  We will have patient increase 250 mg (1-1/2 tabs) in the morning and 100 mg (1 tab) in the evening.  Patient agreeable.  - buPROPion (WELLBUTRIN) 100 MG tablet; Take 1.5 tablets (150 mg) by mouth every morning AND 1 tablet (100 mg) every evening.  - Adult Mental Health  Referral; Future    Irritability and anger  Increased irritability, anger and anxiety as above.  - buPROPion (WELLBUTRIN) 100 MG tablet; Take 1.5 tablets (150 mg) by mouth every morning AND 1 tablet (100 mg) every evening.  - Adult Mental Health  Referral; Future    Other Psychiatry options (make sure to ask about a provider who does medication management)  [facility] ([place])-- [phone]  [facility] ([place]) -- [phone]  [facility] ([facility]) -- [phone]  [facility] ([place]) -- [phone]  [facility] ([place], other [facility] as well) -- [phone]  [facility] ([place]) -- [phone]  [facility] ([place], multiple locations) -- [phone]  [facility] ([facility], multiple locations) -- [phone]

## 2022-08-21 ENCOUNTER — HEALTH MAINTENANCE LETTER (OUTPATIENT)
Age: 22
End: 2022-08-21

## 2022-12-26 ENCOUNTER — HEALTH MAINTENANCE LETTER (OUTPATIENT)
Age: 22
End: 2022-12-26

## 2023-09-17 ENCOUNTER — HEALTH MAINTENANCE LETTER (OUTPATIENT)
Age: 23
End: 2023-09-17

## 2023-10-26 NOTE — NURSING NOTE
"Initial /61 (BP Location: Left arm, Patient Position: Sitting, Cuff Size: Adult Regular)   Pulse 68   SpO2 95%  Estimated body mass index is 21.47 kg/m  as calculated from the following:    Height as of 1/21/19: 1.753 m (5' 9\").    Weight as of 2/13/19: 66 kg (145 lb 6.4 oz). .      " Patent

## 2024-02-11 ENCOUNTER — NURSE TRIAGE (OUTPATIENT)
Dept: NURSING | Facility: CLINIC | Age: 24
End: 2024-02-11
Payer: COMMERCIAL

## 2024-02-11 ENCOUNTER — HOSPITAL ENCOUNTER (EMERGENCY)
Facility: CLINIC | Age: 24
Discharge: HOME | End: 2024-02-11
Attending: EMERGENCY MEDICINE | Admitting: EMERGENCY MEDICINE
Payer: COMMERCIAL

## 2024-02-11 VITALS
WEIGHT: 158 LBS | DIASTOLIC BLOOD PRESSURE: 64 MMHG | HEIGHT: 69 IN | TEMPERATURE: 97.3 F | HEART RATE: 74 BPM | OXYGEN SATURATION: 98 % | RESPIRATION RATE: 16 BRPM | SYSTOLIC BLOOD PRESSURE: 117 MMHG | BODY MASS INDEX: 23.4 KG/M2

## 2024-02-11 DIAGNOSIS — F33.1 MAJOR DEPRESSIVE DISORDER, RECURRENT EPISODE, MODERATE (H): ICD-10-CM

## 2024-02-11 DIAGNOSIS — R45.851 SUICIDAL IDEATION: ICD-10-CM

## 2024-02-11 DIAGNOSIS — Z78.9 ALCOHOL USE: ICD-10-CM

## 2024-02-11 DIAGNOSIS — F12.90 MARIJUANA USE: ICD-10-CM

## 2024-02-11 PROBLEM — F12.19: Status: ACTIVE | Noted: 2024-02-11

## 2024-02-11 PROBLEM — F32.A DEPRESSION, UNSPECIFIED: Status: ACTIVE | Noted: 2022-01-19

## 2024-02-11 PROBLEM — F41.1 GAD (GENERALIZED ANXIETY DISORDER): Status: ACTIVE | Noted: 2018-11-30

## 2024-02-11 LAB — ALCOHOL BREATH TEST: 0 (ref 0–0.01)

## 2024-02-11 PROCEDURE — 99284 EMERGENCY DEPT VISIT MOD MDM: CPT

## 2024-02-11 RX ORDER — HYDROXYZINE HYDROCHLORIDE 50 MG/1
25-50 TABLET, FILM COATED ORAL 3 TIMES DAILY PRN
Qty: 20 TABLET | Refills: 0 | Status: SHIPPED | OUTPATIENT
Start: 2024-02-11 | End: 2024-02-27

## 2024-02-11 RX ORDER — BUPROPION HYDROCHLORIDE 150 MG/1
150 TABLET ORAL EVERY MORNING
Qty: 14 TABLET | Refills: 0 | Status: SHIPPED | OUTPATIENT
Start: 2024-02-11 | End: 2024-02-27

## 2024-02-11 ASSESSMENT — ACTIVITIES OF DAILY LIVING (ADL)
ADLS_ACUITY_SCORE: 35
ADLS_ACUITY_SCORE: 35

## 2024-02-11 NOTE — ED TRIAGE NOTES
"Pt reports he has been depressed and today having increased suicidal thoughts     Pt reports he doesn't want to kill himself but mom states last night he said \"he wanted to drink enough to not wake up\"     Reports he has not been taking his medications     Denies SI       "

## 2024-02-11 NOTE — ED NOTES
Patient brought . He was with his parents.  He endorses suicidal thoughts with no plan.  He would not do anything that causes pain for himself. He is smoking marijuana everyday and all day.  He was on wellbutrin but quit it because it made him feel bad.  He has not worked for 5-7 months.  Lives with his mom and stays up late and goes to bed late. He is concerned that the marijuana is not working as well and thinks he might be choosing alcohol.  He drank 1.75 liters yesterday of fire ball.  He has been drinking with his friends on the weekend.  He is interested in getting help. Nursing and risk assessments completed.  Assessments reviewed with LMHP and physician. Video monitoring in progress, patient informed.  Admission information reviewed with patient. Patient given a tour of [facility] and instructions on using the facility. Questions regarding [facility] addressed. Pt search completed and belongings inventoried.

## 2024-02-11 NOTE — ED PROVIDER NOTES
"    History     Chief Complaint:  Suicidal       HPI   David Dejesus is a 23 year old male who presents to the emergency department with his parents.  He has had vague thoughts of harming himself but states he would only do it with alcohol or pills he would never use firearms.  Dad does have firearms at home but they are locked.  He has no psychotic thoughts but he occasionally hears voices that are very negative.  He does not want to hurt anyone else.  He does he does admit to some alcohol use and some marijuana.  He had been on venlafaxine but quit that several months ago.  He thinks he would like to get back on meds.      Independent Historian:    Parents     Review of External Notes:  None    Medications:    buPROPion (WELLBUTRIN) 100 MG tablet  cholecalciferol (VITAMIN D3) 5000 units TABS tablet  ISOtretinoin (ACCUTANE) 40 MG capsule  melatonin 3 MG tablet        Past Medical History:    No past medical history on file.    Past Surgical History:    No past surgical history on file.       Physical Exam   Patient Vitals for the past 24 hrs:   BP Temp Temp src Pulse Resp SpO2 Height Weight   02/11/24 1615 117/64 97.3  F (36.3  C) Oral 74 16 98 % 1.753 m (5' 9\") 71.7 kg (158 lb)   02/11/24 1525 139/71 99.1  F (37.3  C) -- 71 16 97 % -- --        Physical Exam  Constitutional: White male sitting no respiratory distress  HENT: No signs of trauma.   Eyes: EOM are normal. Pupils are equal, round, and reactive to light.   Neck: Normal range of motion. No JVD present. No cervical adenopathy.  Cardiovascular: Regular rhythm.  Exam reveals no gallop and no friction rub.    No murmur heard.  Pulmonary/Chest: Bilateral breath sounds normal. No wheezes, rhonchi or rales.  Abdominal: Soft. No tenderness. No rebound or guarding.   Musculoskeletal: No edema. No tenderness.   Lymphadenopathy: No lymphadenopathy.   Neurological: Alert and oriented to person, place, and time. Normal strength. Coordination normal.   Skin: Skin is warm " and dry. No rash noted. No erythema.    Psych: Calm affect no overt psychosis    Emergency Department Course       I    Laboratory:  Labs Ordered and Resulted from Time of ED Arrival to Time of ED Departure   ALCOHOL BREATH TEST POCT - Normal       Result Value    Alcohol Breath Test 0.00          Procedures   None    Emergency Department Course & Assessments:        Interventions:  Medications - No data to display     Assessments:  Seen and evaluated    Independent Interpretation (X-rays, CTs, rhythm strip):  None    Consultations/Discussion of Management or Tests:  None       Social Determinants of Health affecting care:  None     Disposition:  [facility]    Impression & Plan        Medical Decision Making:  Patient presents with his parents feeling down and with vague suicidal thoughts.  He would like to come into the [facility] and get started back on medications in a safe environment I think this is reasonable.  He has been drinking a little but states he has never gone through withdrawal or had problems.  He seems to be a good [facility] candidate      Diagnosis:    ICD-10-CM    1. Depression, unspecified depression type  F32.A       2. Suicidal ideation  R45.851       3. Alcohol use  Z78.9       4. Marijuana use  F12.90                     Miguel Russell MD  2/11/2024   Miguel Russell MD Steinman, Randall Ira, MD  02/11/24 [id number]

## 2024-02-11 NOTE — ED NOTES
[facility]  ED to EMPATH Checklist:      Goal for EMPATH: Suicidality and Medication management    Current Behavior: Flat Affect, Quiet, Sad, Withdrawn, Calm, and Cooperative    Safety Concerns: Suicidal, no plan    Legal Hold Status: Voluntary    Medically Cleared by ED provider: Yes    Patient Therapeutically Searched: Not searched - Currently in triage    Belongings: Remain with patient    Independent Ambulation at Baseline: Yes/No: Yes    Participates in Care/Conversation: Yes/No: Yes    Patient Informed about EMPATH: Yes/No: Yes    DEC: Ordered and pending    Patient Ready to be Transferred to EMPATH? Yes/No: Yes

## 2024-02-11 NOTE — TELEPHONE ENCOUNTER
Mom calling. Consent on file. She states that pt is depressed and has stated he doesn't want to be here anymore. He states that he is scared to actually kill himself and does not have a plan. Pt on speaker and states that he is feeling depressed, hopeless, and doesn't want to do his normal activities. Pt has had MH meds and provider in the past but has not been taking meds and they are looking for MH help sooner than weeks to months as appts can book out that far. They are interested in [place]. They were given this information and other resources. They will present to [facility] ED.     Maine Kelly, RN, BSN  [facility] Nurse Advisor 2:08 PM 2/11/2024      Reason for Disposition   [1] Depression symptoms (sadness, hopelessness, decreased energy) AND [2] unable to do any normal activities (e.g., self care, school, work; in comparison to baseline).    Additional Information   Negative: Patient attempted suicide   Negative: Patient is threatening suicide now   Negative: Violent behavior, or threatening to physically hurt or kill someone   Negative: [1] Patient is very confused (disoriented, slurred speech) AND [2] no other adult (e.g., friend or family member) available   Negative: [1] Difficult to awaken or acting very confused (disoriented, slurred speech) AND [2] new-onset   Negative: Sounds like a life-threatening emergency to the triager   Negative: Depression is main symptom and is not threatening suicide   Negative: Threatening to physically hurt or kill someone    Protocols used: Suicide [id number]

## 2024-02-12 NOTE — ED PROVIDER NOTES
EmPATH Unit - Psychiatric Consultation  [facility] Emergency Department    David Dejesus MRN: [id number]   Age: 23 year old YOB: 2000     History     Chief Complaint   Patient presents with    Suicidal     HPI  David Dejesus is a 23 year old male with history notable for depression, anxiety, alcohol use, and cannabis use who presented to the ED with increasing depressive symptoms and passive suicidal thoughts in the context of ongoing substance use. In the emergency department, this patient was determined to be medically stable and transferred to the EmPATH unit for psychiatric assessment.  Record review indicates no prior mental health admissions and patient is not currently established with any outpatient mental health supports. They have currently been in the emergency department for 3 hours.     David tells me that he has noticed increasing depressive symptoms over the past few months including lack of motivation, anhedonia, passive suicidal thoughts, difficulty concentrating and insomnia. When he tries to fall asleep his mind is racing. He tells me he has passive suicidal thoughts thinking that he would be better off not feeling this way, he denies plans and denies intent. Denies any prior attempts. He has been using cannabis to make him feel better but notes this is no longer working and he has been trying to quit. He tells me that he tried drinking more alcohol when out with friends the past 2 weeks but that this made him feel worse. He tells me he drinks socially with friends on the weekend, usually this is 2-3 drinks however he drank more yesterday due to feeling depressed. He denies every experiencing withdrawal, no seizure history. He is motivated to stop smoking and drinking and requests to complete a substance use disorder assessment to gain additional supports. He recalls previously taking celexa when younger and wellbutrin since 2021. He recalls the wellbutrin helping him decrease his  "cravings to smoke yet stopped this several months ago as he wasn't sure if this was working noting that he had also increased his cannabis use and was worried about interactions. Looking back, he now feels as though the wellbutrin was helpful for his mood and anxiety and requests to restart this. He notes he had previously been taking wellbutrin several times a day and it was challenging to remember all of the doses. Discussed considering Wellbutrin XL for once daily dosing. Discussed alternatives to Wellbutrin yet he would prefer to resume this noting that it has been beneficial in the past. Education provided on minimizing alcohol use while taking this medication, he is in agreement. He would like additional outpatient referrals for psych med management and therapy. He's unsure [facility] He was going to call his parents to consult and then decide if he would like to discharge. Following consultation with his mother he decided to discharge with outpatient supports.     Past Medical History  No past medical history on file.  No past surgical history on file.  buPROPion (WELLBUTRIN) 100 MG tablet  cholecalciferol (VITAMIN D3) 5000 units TABS tablet  ISOtretinoin (ACCUTANE) 40 MG capsule  melatonin 3 MG tablet      No Known Allergies  Family History  No family history on file.  Social History   Social History     Tobacco Use    Smoking status: Never    Smokeless tobacco: Current     Types: Chew    Tobacco comments:     Uses a vape   Substance Use Topics    Alcohol use: No    Drug use: No          Review of Systems  A medically appropriate review of systems was performed with pertinent positives and negatives noted in the HPI, and all other systems negative.    Physical Examination   BP: 139/71  Pulse: 71  Temp: 99.1  F (37.3  C)  Resp: 16  Height: 175.3 cm (5' 9\")  Weight: 71.7 kg (158 lb)  SpO2: 97 %    Physical Exam  General: Appears stated age.   Neuro: Alert and " fully oriented. Extremities appear to demonstrate normal strength on visual inspection.   Integumentary/Skin: no rash visualized, normal color    Psychiatric Examination   Appearance: awake, alert, adequately groomed, appeared as age stated, and casually dressed  Attitude:  cooperative  Eye Contact:  good  Mood:  depressed  Affect:  mood congruent and intensity is flat  Speech:  clear, coherent and normal prosody  Psychomotor Behavior:  no evidence of tardive dyskinesia, dystonia, or tics and intact station, gait and muscle tone  Thought Process:  logical and linear  Associations:  no loose associations  Thought Content:  no evidence of psychotic thought, passive suicidal ideation present, and denies plan and intent   Insight:  fair  Judgement:  intact  Oriented to:  time, person, and place  Attention Span and Concentration:  intact  Recent and Remote Memory:  intact  Language: able to name/identify objects without impairment  Fund of Knowledge: intact with awareness of current and past events    ED Course        Labs Ordered and Resulted from Time of ED Arrival to Time of ED Departure   ALCOHOL BREATH TEST POCT - Normal       Result Value    Alcohol Breath Test 0.00         Assessments & Plan (with Medical Decision Making)   Patient presenting with increasing depressive symptoms, suicidal thoughts, and anxiety in the context of ongoing cannabis use. Patient notes that he stopped his medications 3-4 months ago in conjunction with increased cannabis use. Treatment focused on resuming medication that patient perceives to have been beneficial, Wellbutrin. Education provided on minimizing alcohol use while taking Wellbutrin. Patient is requesting AVILA assessment and additional outpatient services. Nursing notes reviewed noting no acute issues.     I have reviewed the assessment completed by the [facility].     Preliminary diagnosis:    ICD-10-CM    1. Suicidal ideation  R45.851       2. Alcohol use  Z78.9       3. Marijuana  use  F12.90       4. Major depressive disorder, recurrent episode, moderate (H)  F33.1            Treatment Plan:  -Restart Wellbutrin as patient has previously found this to be beneficial further targeting depression and anxiety; Wellbutrin XL 150mg daily  -Start hydroxyzine 25-50mg three times daily as needed for anxiety, I anticipate the need for this will decrease as Wellbutrin reaches therapeutic levels  -Medication education provided this visit including but not limited to: Rationale for medication, importance of medication adherence, medication interactions, common medication side effects, benefits of medications.  -Individual psychotherapy and outpatient psychiatric care recommended for additional support and ongoing development of nonpharmacologic coping skills and strategies.    -Referral for outpatient medication management appointments and psychotherapy  -Referral for AVILA assessment   -Problem focused supportive therapy and education provided today related to patient's current and acute stressors, symptoms, and diagnoses.  -Discharge to home with outpatient supports   --  EILEEN Nielson CNP   [facility] EMERGENCY DEPT  EmPATH Unit       Latasha Hicks APRN CNP  02/11/24 6417

## 2024-02-12 NOTE — ED NOTES
Discharge instructions reviewed with patient including follow-up care plan. Educated on medication regime and advised not to stop prescribed medication without consulting their physician. Patient will  meds at his own pharmacy. Reviewed safety plan and outpatient resources/appointments.Verbalized understanding of discharge instructions. Denies suicidal ideation.  States it is chronic suicidal thoughts but would not attempt it. All belongings which where brought into the hospital have been returned to patient. Escorted off the unit @ 2005 with staff.    Discharged to home with his mother.

## 2024-02-12 NOTE — CONSULTS
"Diagnostic Evaluation Consultation  Crisis Assessment    Patient Name: David Dejesus  Age:  23 year old  Legal Sex: male  Gender Identity: male  Pronouns: he/him  Race: White  Ethnicity: Not  or   Language: English      Patient was assessed: In person      Patient location: [facility] EMERGENCY DEPT                             [id number]    Referral Data and Chief Complaint  David Dejesus presents to the ED with family/friends. Patient is presenting to the ED for the following concerns: Anxiety, Substance use, Depression, Suicidal ideation.   Factors that make the mental health crisis life threatening or complex are:  Pt arrives to the ED with his parents today due to worsening depression and anxiety symptoms, as well as recent suicidal ideation that includes thougths of \"wishing he would not wake up.\" Pt reportedly drank about a liter of alcohol last night due to feeling depressed. He became overwhelmed this morning after seeing notice that his car payment is overdue and began to cry and tell his parents about his suicidal ideation. During assessment, pt denies any thoughts around methods for killing himself and denies having any suicidal intent or planning. Pt says that he would never want to do anything that would cause pain to himself or loved ones. 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 Pt denies that he has experienced any other life changes or recent stressors. Pt reports his depressive symptoms as being anhedonia, dysphoric mood, lack of motivation, low self-esteem, and feelings of hopelessness. Pt reports anxiety symptoms that include excessive worrying, profuse sweating, and racing thoughts. Pt denies having any HI, AH/VH, or NSSIB. Pt says he is interested in getting set up with mental health services such as psychiatry and therapy. Pt reports " that he has been prescribed Wellbutrin and other SSRIs in the past but is currently not taking any medications. Pt says he uses THC daily as a means of coping with his anxiety and depression and is wanting to quit use. Pt reports that his alcohol use has mostly been social use on the weekends and that last night's excessive use is not typical and was due to not having access to marijuana..      Informed Consent and Assessment Methods  Explained the crisis assessment process, including applicable information disclosures and limits to confidentiality, assessed understanding of the process, and obtained consent to proceed with the assessment.  Assessment methods included conducting a formal interview with patient, review of medical records, collaboration with medical staff, and obtaining relevant collateral information from family and community providers when available.  : done     Patient response to interventions: acceptance expressed  Coping skills were attempted to reduce the crisis:  Pt reports that he used alcohol as a means of coping with his worsening depression. Pt reports that this was unhelpful and made him feel worse. Pt reports that he exercised today which helped reduce his symptoms a bit but was unable to resolve his crisis on his own.     History of the Crisis   Pt reports that he has had anxiety and depression on and off for years but that symptoms have markedly worsened in the past few weeks. Pt denies having any previous psychiatric inpatient hospitalizations or mental health-related ED visits. Pt identifies physical exercise as his main coping skill.    Brief Psychosocial History  Family:  Single, Children no  Support System:  Parent(s), Other (specify), Grandparent(s) (best friend Ray)  Employment Status:  seasonal worker  Source of Income:  salary/wages, public assistance  Financial Environmental Concerns:  other (see comments) (financial stress from underemployment)  Current Hobbies:   exercise/fitness, sports/team sports  Barriers in Personal Life:       Significant Clinical History  Current Anxiety Symptoms:  racing thoughts, excessive worry, anxious  Current Depression/Trauma:  withdrawl/isolation, crying or feels like crying, low self esteem, thoughts of death/suicide, sadness, hopelessness  Current Somatic Symptoms:     Current Psychosis/Thought Disturbance:     Current Eating Symptoms:     Chemical Use History:  Alcohol: Social  Last Use:: 02/10/24  Benzodiazepines: None  Opiates: None  Cocaine: None  Marijuana: Daily  Last Use:: 02/11/24  Other Use: None   Past diagnosis:  Anxiety Disorder  Family history:  Depression  Past treatment:  Psychiatric Medication Management  Details of most recent treatment:  Pt has previously had psychiatric medication management but reports no other previous mental health or substance use services.  Other relevant history:          Collateral Information  Is there collateral information: Yes     Collateral information name, relationship, phone number:  Emery Dejesus (pt's mother) [phone]    What happened today: Pt woke up this morning and became overwhelmed after receiving a notice that his car payment is late. Pt lives with his parents. He started crying and told his parents that he is very depressed and hates his life. He said that he drank alcohol last night and was hoping that he wouldn't wake up today.     What is different about patient's functioning: Pt is employed seasonally as a  and seems to struggle more during off-season times when he doesn't have structure. Pt has been depressed lately and sleeping excessively. He uses THC daily but has run out of THC, so Emery thinks that is why he drank alcohol last night since he doesn't usually drink.     What do you think the patient needs:  medication management, therapy, structure in day    Has patient made comments about wanting to kill themselves/others: yes, pt made statement that he wishes he  would not wake up    If d/c is recommended, can they take part in safety/aftercare planning:  yes    Additional collateral information:  Pt's parents have attempted to get him set up with therapy in the past via his mother's employer's EAP program, but pt refuses to attend the scheduled sessions. He is currently prescribed Venlafaxine 37.5 mg but stopped taking it a few months ago. Pt has low self esteem. He is supposed to resume working for his [employer] company tomorrow. Emery notes that a positive/strength of pt's is that he exercises daily. Emery thinks that what would benefit David right now would be to get started with therapy, to take his prescribed psychotropic medication, and to get structure/routine into his day again.     Risk Assessment  [place] Suicide Severity Rating Scale Full Clinical Version: 02/11/2024  Suicidal Ideation  Q1 Wish to be Dead (Lifetime): Yes  Q2 Non-Specific Active Suicidal Thoughts (Lifetime): Yes  3. Active Suicidal Ideation with any Methods (Not Plan) Without Intent to Act (Lifetime): Yes  Q4 Active Suicidal Ideation with Some Intent to Act, Without Specific Plan (Lifetime): No  Q5 Active Suicidal Ideation with Specific Plan and Intent (Lifetime): No  Q6 Suicide Behavior (Lifetime): no     Suicidal Behavior (Lifetime)  Actual Attempt (Lifetime): No  Has subject engaged in non-suicidal self-injurious behavior? (Lifetime): No  Interrupted Attempts (Lifetime): No  Aborted or Self-Interrupted Attempt (Lifetime): No  Preparatory Acts or Behavior (Lifetime): No    [place] Suicide Severity Rating Scale Recent: 02/11/2024  Suicidal Ideation (Recent)  Q1 Wished to be Dead (Past Month): yes  Q2 Suicidal Thoughts (Past Month): yes  Q3 Suicidal Thought Method: no  Q4 Suicidal Intent without Specific Plan: no  Q5 Suicide Intent with Specific Plan: no  Level of Risk per Screen: low risk  Intensity of Ideation (Recent)  Most Severe Ideation Rating (Past 1 Month): 2  Frequency (Past 1 Month): 2-5  times in week  Duration (Past 1 Month): Less than 1 hour/some of the time  Controllability (Past 1 Month): Can control thoughts with little difficulty  Deterrents (Past 1 Month): Deterrents definitely stopped you from attempting suicide  Reasons for Ideation (Past 1 Month): Completely to end or stop the pain (You couldn't go on living with the pain or how you were feeling)  Suicidal Behavior (Recent)  Actual Attempt (Past 3 Months): No  Has subject engaged in non-suicidal self-injurious behavior? (Past 3 Months): No  Interrupted Attempts (Past 3 Months): No  Aborted or Self-Interrupted Attempt (Past 3 Months): No  Preparatory Acts or Behavior (Past 3 Months): No    Environmental or Psychosocial Events: unemployment/underemployment, ongoing abuse of substances  Protective Factors: Protective Factors: lives in a responsibly safe and stable environment, responsibilities and duties to others, including pets and children, sense of importance of health and wellness, able to access care without barriers, help seeking, reality testing ability    Does the patient have thoughts of harming others? Feels Like Hurting Others: no  Previous Attempt to Hurt Others: no  Is the patient engaging in sexually inappropriate behavior?: no    Is the patient engaging in sexually inappropriate behavior?  no        Mental Status Exam   Affect: Appropriate  Appearance: Appropriate  Attention Span/Concentration: Attentive  Eye Contact: Engaged    Fund of Knowledge: Appropriate   Language /Speech Content: Fluent  Language /Speech Volume: Normal  Language /Speech Rate/Productions: Normal  Recent Memory: Intact  Remote Memory: Intact  Mood: Depressed  Orientation to Person: Yes   Orientation to Place: Yes  Orientation to Time of Day: Yes  Orientation to Date: Yes     Situation (Do they understand why they are here?): Yes  Psychomotor Behavior: Normal  Thought Content: Clear  Thought Form: Intact       Medication  Psychotropic medications:    Medication Orders - Psychiatric (From admission, onward)      Start     Dose/Rate Route Frequency Ordered Stop    02/11/24 0000  buPROPion (WELLBUTRIN XL) 150 MG 24 hr tablet         150 mg Oral EVERY MORNING 02/11/24 1851 02/11/24 0000  hydrOXYzine HCl (ATARAX) 50 MG tablet         25-50 mg Oral 3 TIMES DAILY PRN 02/11/24 1851               Current Care Team  Patient Care Team:  Kia Sam APRN CNP as PCP - General (Family Practice)  Bridget Luna PA-C as Physician Assistant (Dermatology)  Neris Hopper APRN CNP as Assigned PCP    Diagnosis  Patient Active Problem List   Diagnosis Code    Rage attacks F69    ROYCE (generalized anxiety disorder) F41.1    Chronic insomnia F51.04    Acne vulgaris L70.0    Depression, unspecified F32.A    Cannabis abuse with unspecified cannabis-induced disorder (H24) F12.19       Primary Problem This Admission  Active Hospital Problems    Cannabis abuse with unspecified cannabis-induced disorder (H24)      *Depression, unspecified      ROYCE (generalized anxiety disorder)        Clinical Summary and Substantiation of Recommendations   Pt arrived to the ED due to increase in anxiety and depressive symptoms as well as current passive suicidal ideation, which does not include thoughts around method, intent, or suicidal planning. Pt's attempts to use coping skills to resolve the crisis were unsuccessful, which led pt to seek assessment at the ED. Pt not exhibiting any psychosis, AH/VH, HI, or NSSIB. Pt exhibiting future focused thinking with desire to start substance use treatment services as well psychiatric medication management and outpatient therapy. Pt able to engage in safety planning and assessed to be safe for discharge. Pt lives with his parents and will be discharging to their home with referrals for services.    Patient coping skills attempted to reduce the crisis:  Pt reports that he used alcohol as a means of coping with his worsening  depression. Pt reports that this was unhelpful and made him feel worse. Pt reports that he exercised today which helped reduce his symptoms a bit but was unable to resolve his crisis on his own.    Disposition  Recommended disposition: Individual Therapy, Medication Management, Substance Abuse Disorder Treatment, Rule 25/AVILA Assessment        Reviewed case and recommendations with attending provider. Attending Name: Latasha Hicks CNP       Attending concurs with disposition: yes       Patient and/or validated legal guardian concurs with disposition:   yes       Final disposition:  discharge    Legal status on admission: Voluntary/Patient has signed consent for treatment    Assessment Details   Total duration spent with the patient: 30 min     CPT code(s) utilized: 26528 - Psychotherapy for Crisis - 60 (30-74*) min    DANNI Watkins, Psychotherapist  [facility] - Triage & Transition Services  Callback: [phone]

## 2024-02-12 NOTE — DISCHARGE INSTRUCTIONS
Aftercare Plan    Upcoming Appointment(s):    1) Appointment Type: Teletherapy (virtual)  Date/Time: Wednesday, 2/14/2024 at 2:00 pm  Provider: Andrew Gloria  Location: [place] [id number]  Phone: [phone]  Patient Instructions: For all appointments: you will be sent information to fill out the intake paperwork online. Please complete the paperwork prior to your appointment. For telehealth appointments: you will also be sent a zoom link to attend the appointment remotely.    2) Appointment Type: Telepsychiatry (virtual)  Date/Time: Wednesday, 2/21/2024  at 12:00 pm  Provider: Michelle Dhillon DNP, CNP,RN  Location: [place] [address]  Phone: [phone]  Patient Instructions: Before your appointment, you must speak with our Intake Department. Our intake team will attempt to contact you. If you do not hear from them within 24 hours, please call them at [phone] and tell them you are a [facility] referral. If you do not speak with our Intake Department and complete the necessary paperwork they send you, we cannot see you at your scheduled appointment time.            If I am feeling unsafe or I am in a crisis, I can:   Contact my established care providers   Call the National Suicide Prevention Lifeline: 988  Go to the nearest emergency room   Call 911     Your [facility] has a mental health crisis team you can call 24/7: [facility], [phone]    Warning signs that I or other people might notice when a crisis is developing for me:   - isolating, increased agitation, mood swings, inability to cope with daily tasks (I.e. bathing, eating, sleeping all day), destructive behaviors    Things I am able to do on my own to cope or help me feel better:   - practice grounding exercises, such as the 5-4-3-2-1 sensory exercise: this technique asks you to find five things you can see, four things you can touch, three things you  can hear, two things you can smell, and one thing you can taste.    - practice breathing exercises when I begin to feel anxious, such as square breathing:   Step 1: Breathe in counting to four slowly. Feel the air enter your lungs.   Step 2: Hold your breath for 4 seconds. Try to avoid inhaling or exhaling for 4 seconds.   Step 3: Slowly exhale through your mouth/nose for 4 seconds.   Step 4: Hold your breath for 4 seconds   Repeat until you feel re-centered.       Things that I am able to do with others to cope or help me feel better:   - Use community resources, including [employer], [facility], and support meetings    Things I can use or do for distraction:     - Distraction skills: going for walks, watching TV, spending time outside, calling a friend or family member, listening to music, engaging in hobbies/activities   - Download a meditation arianna and spend 15-20 minutes per day mediating/relaxing. Some apps to download include: Calm, Headspace and Insight Timer. All 3 of these apps have free version    Changes I can make to support my mental health and wellness:   -Attend scheduled mental health therapy and psychiatric appointments and follow all recommendations  -Maintain prescribed medication adherence  -Maintain a daily schedule/routine  -Practice deep breathing skills  -Abstain from all mood altering chemicals not currently prescribed to me     People in my life that I can ask for help: National [place] on Mental Illness (MELISSA)  266.736.7608 or 1.888.MELISSA.HELPS    Other things that are important when I m in crisis:   - Commit to 30 minutes of self care daily - this can be as simple as taking a shower, going for a walk, cooking a meal, read, writing, etc        Crisis Lines  Crisis Text Line  Text 433314  You will be connected with a trained live crisis counselor to provide support.    Por espanol, texto  ALVINO a 061724 o texto a 442-AYUDAME en WhatsApp    National Hope Line  1.800.SUICIDE  "[5545095]      Community Resources  Fast Tracker  Linking people to mental health and substance use disorder resources  ContentRealtimeckc3 creationsn.org     [place] Mental Health Warm Line  Peer to peer support  Monday thru Saturday, 12 pm to 10 pm  623.723.6581 or 8.225.323.0351  Text \"Support\" to 75148    National [place] on Mental Illness (MELISSA)  597.476.0124 or 1.888.MELISSA.HELPS        Mental Health Apps  My3  https://ERCOM.org/    VirtualHopeBox  https://Daqi/apps/virtual-hope-box/      Additional Information  Today you were seen by a licensed mental health professional through Triage and Transition services, [facility] (P)  for a crisis assessment in the Emergency Department at [facility].  It is recommended that you follow up with your established providers (psychiatrist, mental health therapist, and/or primary care doctor - as relevant) as soon as possible. Coordinators from [facility] will be calling you in the next 24-48 hours to ensure that you have the resources you need.  You can also contact [facility] coordinators directly at [phone]. You may have been scheduled for or offered an appointment with a mental health provider. [facility] maintains an extensive network of licensed behavioral health providers to connect patients with the services they need.  We do not charge providers a fee to participate in our referral network.  We match patients with providers based on a patient's specific needs, insurance coverage, and location.  Our first effort will be to refer you to a provider within your care system, and will utilize providers outside your care system as needed.     "

## 2024-02-12 NOTE — CARE PLAN
Triage & Transition Services, Extended Care     Client Name: David Dejesus    Date: February 11, 2024    Patient was seen    Mode of Assessment:      Service Type: (P) refused to attend group session  Session Start Time:  (P) 1900    Session End Time: (P) 1910  Session Length: (P) 10  Site Location: [facility] EMERGENCY DEPT                             [id number]  Total Number ofAttendees: (P) 0  Topic:   (P) relaxation techniques   Response: (P) other (see comments) (pt declined to participate in group as he is watching the Super Bowl)     Jacqueline Sosa, [facility]   Licensed Mental Health Professional (LMHP), Extended Care  [phone]

## 2024-02-14 ENCOUNTER — OFFICE VISIT (OUTPATIENT)
Dept: URGENT CARE | Facility: URGENT CARE | Age: 24
End: 2024-02-14
Payer: COMMERCIAL

## 2024-02-14 VITALS
OXYGEN SATURATION: 95 % | TEMPERATURE: 97.5 F | BODY MASS INDEX: 24.07 KG/M2 | WEIGHT: 163 LBS | HEART RATE: 66 BPM | SYSTOLIC BLOOD PRESSURE: 123 MMHG | DIASTOLIC BLOOD PRESSURE: 61 MMHG | RESPIRATION RATE: 16 BRPM

## 2024-02-14 DIAGNOSIS — K08.89 PAIN, DENTAL: Primary | ICD-10-CM

## 2024-02-14 PROCEDURE — 99213 OFFICE O/P EST LOW 20 MIN: CPT

## 2024-02-14 RX ORDER — CHLORHEXIDINE GLUCONATE ORAL RINSE 1.2 MG/ML
15 SOLUTION DENTAL 2 TIMES DAILY
Qty: 210 ML | Refills: 0 | Status: SHIPPED | OUTPATIENT
Start: 2024-02-14 | End: 2024-02-21

## 2024-02-14 RX ORDER — VENLAFAXINE HYDROCHLORIDE 37.5 MG/1
CAPSULE, EXTENDED RELEASE ORAL
COMMUNITY
Start: 2023-05-17 | End: 2024-02-27

## 2024-02-14 NOTE — PROGRESS NOTES
URGENT CARE  Assessment & Plan   Assessment:   David Dejesus is a 23 year old male who's clinical presentation today is consistent with:   1. Pain, dental  - chlorhexidine (PERIDEX) 0.12 % solution; Swish and spit   - magic mouthwash suspension (diphenhydrAMINE, lidocaine, aluminum-magnesium   Plan:  No signs of an acute dental or gingival infection today, thus, will treat patient's dental pain at this time with supportive and symptomatic measures (no signs of abscess, erythremia or edema) very poor dentition noted   Encourage patient to try ibuprofen/Tylenol as needed for pain control.  Also educated patient that there are many over the counter dental preparations that he cab apply directly to the affected tooth or gum, which are very good in taking away the pain, such as Orajel. Also encouraged him to try a heating pad or hot pack at least 3 times daily, to help relieve the discomfort. Will prescribe patient 'magic mouth wash', with numbing medications to help relieve his pain} and a Peridex solution to help prevent future bacterial overgrowth.   Educated patient he needs to acquire a dental appointment as soon as possible for his dental pain.   Discussed that patient should return for follow up if he notes any increased swelling, redness, increased facial pain, or an abscess to the gums OR symptoms do not improve after starting today's treatment (or if symptoms worsen) in next 2-5  days.  No alarm signs or symptoms present   Differential Diagnoses for this patient's chief complaint that I considered include:  dental abscess, Viral URI, sialadenitis, Mumps, sjogrens, salivary gland pathology, TMJ,     Patient is} agreeable to treatment plan and state they will follow-up if symptoms do not improve and/or if symptoms worsen   see patient's AVS 'monitor for' section for specific patient instructions given and discussed regarding what to watch for and when to follow up    EILEEN Huggins [facility]  [facility]ARE [place]      ______________________________________________________________________      Subjective     HPI: David Dejesus is a 23 year old  male who presents today for evaluation the following concerns:   Patient presents endorsing dental pain and swelling in the right lower aspect of the jaw; for  months.    They describe their pain as: pressure and intermittent throbbing   Cause of dental pain: patient states he has a broken tooth he knows needs to be fixed      Review of Systems:  Pertinent review of systems as reflected in HPI, otherwise negative.     Objective    Physical Exam:  Vitals:    02/14/24 1022   BP: 123/61   Pulse: 66   Resp: 16   Temp: 97.5  F (36.4  C)   TempSrc: Tympanic   SpO2: 95%   Weight: 73.9 kg (163 lb)      General:   alert and oriented, no acute distress, non ill-appearing   Vital signs reviewed: afebrile and normotensive    Mouth:   No Intraoral erythema and inflammation noted, poor dentition.   Broken tooth noted on molar of right lower jaw   No fluctuance, no signs of abscess  mastication tolerable to provocation, no uvular deviation noted    Acute tooth sensitivity to percussion,   no friability of gums,  no gingivitis or pulpitis concerns   No signs of infection noted, no purulence  No lymphadenopathy noted     ______________________________________________________________________    I explained my diagnostic considerations and recommendations to the patient, who voiced understanding and agreement with the treatment plan.   All questions were answered.   We discussed potential side effects, risks and benefits of any prescribed or recommended therapies, as well as expectations for response to treatments.  Please see AVS for any patient instructions & handouts given.   Patient was advised to contact the Nurse Care Line, their Primary Care provider, Urgent Care, or the Emergency Department if there are new or worsening symptoms, or call 911 for emergencies.

## 2024-02-14 NOTE — PATIENT INSTRUCTIONS
Diagnosis: Dental    Today   no signs of an infection}     Plan:    Acetaminophen or ibuprofen   Magic mouth wash Peridex mouthwash}   Orajel   NEED to see a Dentist ASAP     Monitor for:   Fever of 101 F  More or increased redness, drainage, or swelling in the treated area  Face or jawbone swelling   Pain that is worsening             Dental Abscess vs Dental pain   An abscess is a sac of fluid (pus). A dental abscess forms when a tooth or the tissue around it becomes infected with bacteria.   The bacteria can enter through a cavity or a crack in a tooth and causes an infection.    It can also infect the gum tissue or bone around a tooth.   An untreated abscess can cause the loss of the tooth. It can even spread to other parts of the body and become life-threatening.  Symptoms of a dental abscess and Dental pain:   Toothache, often severe  Tooth pain with hot, cold, or pressure  Pain in the gums, cheek, or jaw  Bad breath or bitter taste in the mouth  Trouble swallowing or opening the mouth  Swollen or enlarged neck glands  infection: fever, redness, swelling, pus filled pocket, drainage, abscess

## 2024-02-26 ENCOUNTER — TELEPHONE (OUTPATIENT)
Dept: BEHAVIORAL HEALTH | Facility: CLINIC | Age: 24
End: 2024-02-26
Payer: COMMERCIAL

## 2024-02-26 NOTE — TELEPHONE ENCOUNTER
Coordinator received approval for patient could be seen next day 2/27 @ 1:30 (for 60 min appt to go over admin time - okayed by [place]).    Coordinator attempted to reach patient, no answer and unable to LVM. [employer] message sent to patient.    Tran Cleveland  Transition Clinic Coordinator  02/26/24 2:44 PM

## 2024-02-26 NOTE — TELEPHONE ENCOUNTER
Writer spoke with patient and patients mother who stated they have long term therapy and psychiatry appointments. TC appointment needed same or next day and urgent teams message sent for psychiatry.    Zainab Schmitt  02/26/2024  938  ----- Message from Reema March sent at 2/26/2024  9:22 AM CST -----  Transition Clinic Referral   [place]         Please Check Type of Referral Requested:       __x__THERAPY: The Transition clinic is able to schedule patients without current medical insurance; these patient will be referred to our Social Work Care Coordinator for Medical Insurance              Assistance. We are open for referral for psychotherapy. Patient is referred from:  [facility]      ____MEDICATION:   Referrals for Medication are ONLY accepted from the following areas (select): EmPATH - HIGH PRIORITY                                        Suboxone and Opioid Management Referrals are automatically denied.   TC Psychiatry cannot see patient without active medical insurance.   Next level of psychiatry care must be within 12 months.  TC Psychiatry cannot accept patient with next level of care scheduled with PCP.  The transition clinic cannot follow patients who are on a restricted recipient program.    ___ Long-Acting Injection (SHERMAN)?    Is referred patient receiving a long-acting injection (SHERMAN)?  If YES, provide the following:    Name and dose of Medication: unknown  Date Injection was last administered to patient: n/a  Next Long- Acting injection Due Date: n/a    Is referred patient transferring from [facility]?  If YES, provide the following:     ___  SHERMAN will be receiving SHERMAN [facility] [place]  ___  SHERMAN will be administered per an IRTS or elsewhere in the community       GUARDIAN: If your patient is not their own Guardian, please provide the following:    Guardian Name:  Guardian Contact Information (Phone & Email) :  Guardian Address:     FOSTER CARE PROVIDER:  If your patient lives at a Licensed Foster Care, please provide the following:    Foster Provider:  Foster Provider Contact Information (Phone & Email):  Foster Provider Address:     Referring Provider Contact Name: Reema; Phone Number: [phone]    Reason for Transition Clinic Referral: Pt is about to run out of medicine and his apt w/ new long term provicer is 3/4/[address] at 2pm.     Date: Monday, 3/4/2024  Time: 2:00 pm - 3:00 pm  Provider: Michelle Dhillon DNP, CNP,RN  Location: [place] [address]  Phone: [phone]  Type: Telepsychiatry    What Would Be Helpful from the Transition Clinic: Bridge care until apt w/ long term provider.     Needs: NO    Does Patient Have Access to Technology: yes    Patient E-mail Address: [email or web address]    Current Patient Phone Number: [phone];    Clinician Gender Preference (if applicable): NO    Patient location preference: [place]      (Master Form: Updated 11/28/2023)

## 2024-02-26 NOTE — TELEPHONE ENCOUNTER
Patient called back to TC and scheduled next day appt.    Tran Cleveland  Transition Clinic Coordinator  02/26/24 2:47 PM

## 2024-02-27 ENCOUNTER — TELEPHONE (OUTPATIENT)
Dept: BEHAVIORAL HEALTH | Facility: CLINIC | Age: 24
End: 2024-02-27
Payer: COMMERCIAL

## 2024-02-27 ENCOUNTER — VIRTUAL VISIT (OUTPATIENT)
Dept: BEHAVIORAL HEALTH | Facility: CLINIC | Age: 24
End: 2024-02-27
Payer: COMMERCIAL

## 2024-02-27 DIAGNOSIS — F12.90 CONTINUOUS CANNABIS USE: ICD-10-CM

## 2024-02-27 DIAGNOSIS — F41.1 GAD (GENERALIZED ANXIETY DISORDER): ICD-10-CM

## 2024-02-27 DIAGNOSIS — F33.1 MODERATE EPISODE OF RECURRENT MAJOR DEPRESSIVE DISORDER (H): Primary | ICD-10-CM

## 2024-02-27 PROCEDURE — 99204 OFFICE O/P NEW MOD 45 MIN: CPT | Mod: 95 | Performed by: NURSE PRACTITIONER

## 2024-02-27 RX ORDER — AMOXICILLIN 500 MG/1
500 CAPSULE ORAL 3 TIMES DAILY
COMMUNITY

## 2024-02-27 RX ORDER — BUPROPION HYDROCHLORIDE 150 MG/1
150 TABLET ORAL EVERY MORNING
Qty: 30 TABLET | Refills: 1 | Status: SHIPPED | OUTPATIENT
Start: 2024-02-27

## 2024-02-27 RX ORDER — HYDROXYZINE HYDROCHLORIDE 50 MG/1
50 TABLET, FILM COATED ORAL 3 TIMES DAILY PRN
Qty: 90 TABLET | Refills: 0 | Status: SHIPPED | OUTPATIENT
Start: 2024-02-27

## 2024-02-27 ASSESSMENT — ANXIETY QUESTIONNAIRES
7. FEELING AFRAID AS IF SOMETHING AWFUL MIGHT HAPPEN: NOT AT ALL
5. BEING SO RESTLESS THAT IT IS HARD TO SIT STILL: NOT AT ALL
GAD7 TOTAL SCORE: 5
6. BECOMING EASILY ANNOYED OR IRRITABLE: MORE THAN HALF THE DAYS
2. NOT BEING ABLE TO STOP OR CONTROL WORRYING: SEVERAL DAYS
1. FEELING NERVOUS, ANXIOUS, OR ON EDGE: SEVERAL DAYS
4. TROUBLE RELAXING: NOT AT ALL
IF YOU CHECKED OFF ANY PROBLEMS ON THIS QUESTIONNAIRE, HOW DIFFICULT HAVE THESE PROBLEMS MADE IT FOR YOU TO DO YOUR WORK, TAKE CARE OF THINGS AT HOME, OR GET ALONG WITH OTHER PEOPLE: SOMEWHAT DIFFICULT
3. WORRYING TOO MUCH ABOUT DIFFERENT THINGS: SEVERAL DAYS
GAD7 TOTAL SCORE: 5

## 2024-02-27 ASSESSMENT — PATIENT HEALTH QUESTIONNAIRE - PHQ9: SUM OF ALL RESPONSES TO PHQ QUESTIONS 1-9: 9

## 2024-02-27 NOTE — TELEPHONE ENCOUNTER
Per request of clinician, reached out to patient to offer 12:30 appt slot as there was a cancellation. Pt agreed and appt moved to 12:30pm.    Tran Cleveland  Transition Clinic Coordinator  02/27/24 11:23 AM

## 2024-02-27 NOTE — PROGRESS NOTES
"  Mental Health and Collaborative Care Psychiatry Service Rooming Note      Most pressing mental health concern at this time: I mainly want to get my medications filled out.  Without my medication my depression settles in and gets bad.  I feel significant improvement Wellbutrin and Hydroxyzine.        Any new physical health conditions or diagnoses affecting you that we should be aware of: No.        Side effects related to medications patient would like to discuss with the provider:  No      Are you taking your medications as prescribed?  Yes  If not, why? na      Do you need refills of any of the medications?  yes  If so, which ones? hydrOXYzine HCl (ATARAX) 50 MG tablet buPROPion (WELLBUTRIN XL) 150 MG 24 hr tablet       Are you taking any recreational substances? Smoke marijuana daily but has not had any for 2 days.  Alcohol occasionally.  No alcohol for 2 weeks.        Add attendance guidelines .phrase here   Care team has reviewed attendance agreement with patient. Patient advised that two failed appointments within 6 months may lead to termination of current episode of care.       **If appointment is with Transition Clinic-include the following:      \"The Transition Clinic is a temporary psychiatry service that helps to bridge the time to your next appointment. It is not intended to be a long-term service and you are expected to attend your scheduled appointment with your next provider.\"    [x ] Patient/Parent verbalized understanding     If you need support between appointments, please call [phone] and let them know you're seen by Transition Clinic Psychiatry. You may also send a [employer] message to reach us.     New (awaiting) Mental health provider or next programming:   Date: Monday, 3/4/2024  Time: 2:00 pm - 3:00 pm  Provider: Michelle Dhillon DNP, CNP,RN  Location: [place] [address]  Phone: [phone]  Type: Telepsychiatry      Patient would like the " video visit invitation sent by: email [email or web address]    Ryne Springer RN on 2/27/2024 at 12:06 PM

## 2024-02-27 NOTE — PROGRESS NOTES
"[facility]      Mental Health & Addiction Service Line    Transition Clinic: Psychiatry Note  Medication Management              VISIT INFORMATION    Date:  2024     Number:  -Initial       Patient Identifying Information:  Legal name: David Dejesus  Preferred name: David  : 2000      Participants:   -Patient  -Provider      Telehealth visit details:  Type of service:  Video  Patient location:  At home, Off site  Provider Location:  [facility]  Platform utilized:  Only-apartments    Start time: 12:38 pm  End time:  12:51 pm      HPI      Copied/Pasted from 2024 DEC encounter:    David Dejesus presents to the ED with family/friends. Patient is presenting to the ED for the following concerns: Anxiety, Substance use, Depression, Suicidal ideation.   Factors that make   the mental health crisis life threatening or complex are:  Pt arrives to the ED with his parents today due to worsening depression and anxiety symptoms, as well as recent suicidal ideation that includes thougths of \"wishing he would not wake up.\"     Pt reportedly drank about a liter of alcohol last night due to feeling depressed. He became overwhelmed this morning after seeing notice that his car payment is overdue and began to cry and tell his parents about his suicidal ideation. During assessment, pt denies any thoughts   around methods for killing himself and denies having any suicidal intent or planning. Pt says that he would never want to do anything that would cause pain to himself or loved ones.     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 Pt denies that he has experienced any other life changes or recent stressors.     Pt reports his depressive symptoms as being anhedonia, dysphoric mood, lack of motivation, low self-esteem, and feelings of " hopelessness. Pt reports anxiety symptoms that include excessive worrying, profuse sweating, and racing thoughts.     Pt denies having any HI, AH/VH, or NSSIB. Pt says he is interested in getting set up with mental health services such as psychiatry and therapy. Pt reports that he has been prescribed Wellbutrin and other SSRIs in the past but is currently not taking any medications.     Pt says he uses THC daily as a means of coping with his anxiety and depression and is wanting to quit use. Pt reports that his alcohol use has mostly been social use on the weekends and that last night's excessive use is not typical and was due to not having access to marijuana.       --------------    -Having issues with my tooth  -Had an infection + got it filled  -Still having some minor issues with pain, therefore not sleeping great        PSYCHIATRIC ROS    Sleep:   -See above  -Before the tooth infection 6 to 8 hours    Appetite/Weight Changes:   -Denies unintentional loss or gain > 10 lbs in the past 4 weeks  -----------------  -Fine, feel hungry     Energy Levels:   -7 or 8/10  -Usually not fatigued or tired   -Don't need to take naps    Trauma hx:   -Denies P/E/S    Depression/Anxiety:   -See abve    -Depressive symptoms reduced/improved when going back on Wellbutrin  after the 2/11/2024 [facility] encounter    Suicidal ideations:   +Passive  -Denies intent or plan    SIB:  -Denies current engagement of self harm     Side effects:  -None reported         MENTAL HEALTH HISTORY      Individual therapy or IOP:   -Participating in individual therapy    Suicide attempts:  -None reported     Inpatient psychiatric hospitalizations:  -None reported   -No hx of commitments     ECT:  -None reported         Medication Trials:    SSRIs:  -Celexa 40 mg    SNRIs:  -Effexor 37.5 mg    Antipsychotics:  -Risperdal 1 mg BID      SUBSTANCE USE    Prior use:  -Denies any history of alcohol, recreational, or illicit substance use resulting in:    legal issues + hasn't ever gone through detox    -Recently requested to undergo c/d assessment during 2/11/2024 [facility] encounter        Current use:    Alcohol:   -None reported since discharge from [facility] on 2/11/2024      Recreational Drugs:   -THC daily (but none for the past 2 days)      Medical Marijuana:  -None reported       Cigarettes per day:   -None reported       Chewing tobacco:   -None reported       Vaping:    -Nicotine      Caffeine intake per day:    -2 energy drinks      SOCIAL HISTORY  -Was reviewed within the EMR per: 2/11/2024 encounter by DANNI Collins        MEDICAL HISTORY    Current:  -The problem list was reviewed prior to the appointment  -The patient denies any concerning physical and or medical symptoms during the interviewing process      Developmental:   -Mother had normal pregnancy: Yes  -Met age appropriate milestones: Yes  -Participated in special education classes and or had an IEP: No  -Hx of autism spectrum disorder, learning disability, and or other cognitive disorder: No      Neurological:  -Denies any hx of: seizures, concussions, or TBI        MEDICATIONS      Current Outpatient Medications:     buPROPion (WELLBUTRIN XL) 150 MG 24 hr tablet, Take 1 tablet (150 mg) by mouth every morning, Disp: 14 tablet, Rfl: 0    cholecalciferol (VITAMIN D3) 5000 units TABS tablet, Take 5,000 Units by mouth daily (Patient not taking: Reported on 2/14/2024), Disp: , Rfl:     hydrOXYzine HCl (ATARAX) 50 MG tablet, Take 0.5-1 tablets (25-50 mg) by mouth 3 times daily as needed for anxiety, Disp: 20 tablet, Rfl: 0    ISOtretinoin (ACCUTANE) 40 MG capsule, Take 1 capsule (40 mg) by mouth daily with food (Patient not taking: Reported on 2/14/2024), Disp: 30 capsule, Rfl: 0    magic mouthwash suspension (diphenhydrAMINE, lidocaine, aluminum-magnesium & simethicone), Swish and swallow 10 mLs in mouth every 6 hours as needed for mild pain, Disp: 200 mL, Rfl: 0    venlafaxine (EFFEXOR XR) 37.5  MG 24 hr capsule, , Disp: , Rfl:       If a controlled substance has been prescribed during the appointment:    -The [place] Prescription Monitoring Program has been reviewed and there are no current concerns with: diversionary activity, early refill requests, and or obtaining the medication from multiple providers.      VITALS    BP Readings from Last 3 Encounters:   02/14/24 123/61   02/11/24 117/64   03/25/22 115/67       Pulse Readings from Last 3 Encounters:   02/14/24 66   02/11/24 74   03/25/22 85       Wt Readings from Last 3 Encounters:   02/14/24 73.9 kg (163 lb)   02/11/24 71.7 kg (158 lb)   01/19/22 60.3 kg (133 lb)       LABS    The following have been reviewed prior to or during the appointment:  -2/11/2024      SCALES          7/8/2021     8:44 AM 1/19/2022     1:25 PM 7/26/2022    11:45 AM   PHQ   PHQ-9 Total Score 4 4 5   Q9: Thoughts of better off dead/self-harm past 2 weeks Not at all Not at all Not at all            7/8/2021     8:44 AM 1/19/2022     1:25 PM 7/26/2022    11:45 AM   ROCYE-7 SCORE   Total Score  4 (minimal anxiety)    Total Score 1 4 5        MENTAL STATUS EXAMINATION    Appearance: Adequately Groomed (shirt off for telehealth visit)  General Behavior:  Cooperative, Direct Eye Contact  Speech: Fluent, Normal rate and volume  Musculoskeletal:    -Gait not observed during t.h. visit  -No facial tics/tremors observed   -Motor coordination is grossly intact   Mood: It's doing better  Affect:  Appropriate to Content of Speech and Circumstances  Attention: Intact  Orientation:  Person, Place, Time, Situation  Thought Associations:  Intact  Thought Content: Reality based   Thought Processes: Organized, Normal rate  Memory: No overt impairment; no screenings or formal testing performed  Language: Intact  Judgement: Fair to good  Insight: Fair to good        ASSESSMENT/CLINICAL IMPRESSIONS    Summary:    David Dejesus is a 24 y/o male with a history of: MDD, ROYCE, and Substance Use  (THC).    Recently went to the ED/[facility] on 2/11/2024 in the context of a binge drinking episode (although usually doesn't consume alcohol), worsening depressive symptoms including   suicidal ideations, and moderate to high levels of anxiety related to psychosocial stressors.    Is attending today's appointment for the management of psychotropics/bridging until able to establish long term outpatient psychiatric services.    David outlines he wants to make sure he continues taking Wellbutrin (script just ran out).    Although he endorses suicidal ideations, he states they are less frequent and intense since   the above discharge.    Comments sleep patterns have worsened in the past few days due to tooth infection however he expects this will stabilize as he completes the antibiotic regimen.    Denies any intent or plan to act on passive suicidal thoughts, is aware of the safety plan (see below) and will implement if necessary.        DSM-V and or working diagnosis:    1.  Moderate episode of recurrent major depressive disorder     2.  ROYCE    3.  Continuous cannabis use       Rule outs:    4.  CUD        SAFETY EVALUATION:  Suicidal ideations:  -passive  -denies intent or plan  Homicidal ideations:  -denies  Risk factors:  -age, male  -hx of substance abuse  -recent worsening of mental health symptoms  Protective and mitigating factors:  -no prior attempts  -engaged in outpatient mental health services  -friends, family  Risk assessment:  -low to medium      SAFETY PLAN:  -Has numbers of at least 1 family member + 1 friend in personal contact list  -Knows clinic number(s) + operation of regular business hours   -Reviewed to utilize 988 or text [place] to [phone] for mental health crisis  -Discussed to call 911 and or return to the nearest Emergency Department if unable to maintain safety of self or others (due to severity of suicidal and or homicidal ideations)          TREATMENT  PLAN      Medications:  Continue:  Bupropion/Wellbutrin  mg every morning    Hydroxyzine/Atarax 50 mg three times daily as needed for anxiety        Labs:  -None Obtained        Therapy and or Non-pharmacological modalities:  -Consider reducing or abstaining from THC        Disposition:  -Has been advised of consultative Transition Clinic model    -You do not need to return to the Transition Clinic for medication management    -Please make sure to keep the appointment for longitudinal outpatient psychiatry services  (see below):    Date: Monday, 3/4/2024  Time: 2:00 pm - 3:00 pm  Provider: Michelle Dhillon DNP, CNP,RN  Location: [place] [address]  Phone: [phone]  Type: Telepsychiatry          Total time:  45 minutes per:    -Review of EMR   -Appointment time  -Documentation           Makenzie ARELLANO-CNP,  [facility]-BC          ----------------------------------------------------------------------------------------------------------------------        TREATMENT RISK STATEMENT    The risks, benefits, alternatives, and potential adverse effects have been explained and are understood by the patient.  The patient agrees to the treatment plan with their ability to do so.      The patient knows to call the clinic: [phone]  for any problems or concerns until the next psychiatry visit, regardless if it is within or outside of the [facility] system.     If unable to reach clinic staff (via phone call or medical messaging) during the normal business hours: 8:00 am to 4:30 pm then it is recommended accessing the nearest: emergency department, urgent care facility, or utilizing local (varies based on county of residence) and national crisis #'s or text messaging services for immediate assistance.          ----------------------------------------------------------------------------------------------------------------------        If applicable the following has  been discussed with the patient, parent/guardian, and or attending family member during the appointment:    Risks of polypharmacy and possible drug interactions with current medication list + common OTC products, herbs, and supplements.  Moving forward, it is suggested to intermittently check-in with a clinic or retail pharmacist whenever new medications or OTC/h/s are consumed.    Risks of polypharmacy and possible drug + drug interactions with current medication list.  Moving forward, it is suggested to intermittently check-in with a clinic or retail pharmacist whenever new prescription medications are added to your treatment regimen.    Recommendation to adhere to CDC guidelines as it relates to alcohol consumption.  If taking benzodiazepines, you should abstain from alcohol intake due to increased risks of CNS and respiratory depression, as well as psychomotor impairment.    If possible, it is recommended to avoid concurrent use of prescribed: opioids + benzodiazepines due to increased risks of CNS and respiratory depression, as well as the increased risk of overdose.     Recommendation to minimize and or abstain from THC use (unless you are prescribed medical marijuana).    Recommendation to abstain from illicit substances including but not limited to the following: heroin, street fentanyl, cocaine, methamphetamines, bath salts, and other synthetic products.    Recommendation to abstain from tobacco/smoking/nicotine, alcohol, THC, and all illicit substances if trying to become or are pregnant.    Do not take opioids, stimulants, and or other prescription medications unless they are specifically prescribed for you.     Black Box Warnings associated with the prescribed psychotropic(s).     Potential adverse effects of antipsychotics including but not limited to the following: weight gain, metabolic syndrome, EPS/Tardive Dyskinesias, and Neuroleptic Malignant Syndrome.    Potential adverse effects of stimulants  including but not limited to the following: sudden death, MI, stroke, HTN, cardiomyopathy (long term use), seizures, benito, psychosis, and aggressive behaviors.

## 2024-02-29 NOTE — PATIENT INSTRUCTIONS
"Continue:  Bupropion/Wellbutrin  mg every morning    Hydroxyzine/Atarax 50 mg three times daily as needed for anxiety    -------------------    -You do not need to return to the Transition Clinic for medication management    -Please make sure to keep the appointment for longitudinal outpatient psychiatry services  (see below):    Date: Monday, 3/4/2024  Time: 2:00 pm - 3:00 pm  Provider: Michelle Dhillon DNP, CNP,RN  Location: [place] [address]  Phone: [phone]  Type: Telepsychiatry      ---------------------------------------      Patient Education   The Transition Clinic  What to Expect  Here's what to expect in the Transition Clinic.   About the clinic  The Transition Clinic cares for you while you wait for long-term help.   You'll meet with a psychiatric doctor, who can give you medicine to help you feel better. You'll meet with them for about 5 visits or less. You'll see a different psychiatric doctor for your ongoing care. The clinic nurses and coordinators will help you guide your care.  If you have any questions or concerns, we'll be glad to talk with you.  About visits  Be open  At your visits, please talk openly about your problems. It may feel hard, but it's the best way for us to help you.  Cancelling visits  If you can't come to your visit, please call us right away at [phone]. If you don't cancel at least 24 hours (1 full day) before your visit, that's \"late cancellation.\"  Not showing up for your visits  Being very late is the same as not showing up. You will be a \"no show\" if:  You're more than 15 minutes late for a 30-minute (half hour) visit.  You're more than 30 minutes late for a 60-minute (full hour) visit.  If you cancel late or don't show up 2 times within 6 months, we may end your care.   If your ongoing care with a psychiatric doctor is cancelled, we may end your care at the Transition Clinic. If that happens, we'll give you " referrals and enough medicine to last 3 months. The Transition Clinic is only used to bridge the gap between your care.  Getting help between visits  If you need help between visits, you can call us Monday to Friday from 8 a.m. to 4:30 p.m. at [phone].  Emergency care   Call 911 or go to the nearest emergency department if your life or someone else's life is in danger.  Call 988 anytime to reach the national Suicide and Crisis hotline.  Medicine refills  To refill your medicine, call your pharmacy. You can also call the Transition Clinic at [phone], Monday to Friday, 8 a.m. to 4:30 p.m. It can take 1 to 3 business days to get a refill.   Forms, letters, and tests  You may have papers to fill out, like FMLA, short-term disability, and workability. We'll find out if we can do them and let you know. It can take 5 to 7 business days to get them filled out, and we may need you to come in for a visit.  Before we can give you medicine for ADHD, we may refer you to get tested for it or confirm it another way.  We don't do mental health checks ordered by the court.   We don't do mental health testing, but we can refer you to get tested.   Thank you for choosing us for your care.  For informational purposes only. Not to replace the advice of your health care provider. Copyright   2022 [facility]. All rights reserved. RELEASEIF 658601 - 12/22.

## 2024-06-26 ENCOUNTER — OFFICE VISIT (OUTPATIENT)
Dept: URGENT CARE | Facility: URGENT CARE | Age: 24
End: 2024-06-26
Payer: COMMERCIAL

## 2024-06-26 ENCOUNTER — ANCILLARY PROCEDURE (OUTPATIENT)
Dept: GENERAL RADIOLOGY | Facility: CLINIC | Age: 24
End: 2024-06-26
Attending: NURSE PRACTITIONER
Payer: COMMERCIAL

## 2024-06-26 VITALS
RESPIRATION RATE: 16 BRPM | TEMPERATURE: 97.9 F | HEART RATE: 92 BPM | OXYGEN SATURATION: 98 % | BODY MASS INDEX: 23.48 KG/M2 | WEIGHT: 159 LBS | DIASTOLIC BLOOD PRESSURE: 74 MMHG | SYSTOLIC BLOOD PRESSURE: 124 MMHG

## 2024-06-26 DIAGNOSIS — M25.512 ACUTE PAIN OF LEFT SHOULDER: ICD-10-CM

## 2024-06-26 DIAGNOSIS — M25.512 ACUTE PAIN OF LEFT SHOULDER: Primary | ICD-10-CM

## 2024-06-26 PROCEDURE — 99214 OFFICE O/P EST MOD 30 MIN: CPT | Performed by: NURSE PRACTITIONER

## 2024-06-26 PROCEDURE — 73030 X-RAY EXAM OF SHOULDER: CPT | Mod: TC | Performed by: RADIOLOGY

## 2024-06-26 RX ORDER — PREDNISONE 20 MG/1
40 TABLET ORAL DAILY
Qty: 10 TABLET | Refills: 0 | Status: SHIPPED | OUTPATIENT
Start: 2024-06-26 | End: 2024-07-01

## 2024-06-26 NOTE — PATIENT INSTRUCTIONS
Take steroid daily for 5 days for inflammation.  Rest the shoulder, ice 15 min off and on for next 2-3 days.  Gentle stretching as able.  Follow up with ortho if it continues to be painful.

## 2024-06-26 NOTE — PROGRESS NOTES
SUBJECTIVE:   David Dejesus is a 23 year old male presenting with a chief complaint of   Chief Complaint   Patient presents with    Musculoskeletal Problem     Neck and shoulder pain from working out/lifting weights   Left shoulder pain for a few a weeks gradually getting worse. Williams a pop when working out a while back but never got it checked.  Has tried tylenol and ibuprofen which help but pain comes back.    No past medical history on file.  No family history on file.  Current Outpatient Medications   Medication Sig Dispense Refill    buPROPion (WELLBUTRIN XL) 150 MG 24 hr tablet Take 1 tablet (150 mg) by mouth every morning 30 tablet 1    hydrOXYzine HCl (ATARAX) 50 MG tablet Take 1 tablet (50 mg) by mouth 3 times daily as needed for anxiety 90 tablet 0    amoxicillin (AMOXIL) 500 MG capsule Take 500 mg by mouth 3 times daily Take for 10 days. (Patient not taking: Reported on 6/26/2024)       Social History     Tobacco Use    Smoking status: Never    Smokeless tobacco: Former     Types: Chew    Tobacco comments:     Uses a vape   Substance Use Topics    Alcohol use: No       OBJECTIVE  /74   Pulse 92   Temp 97.9  F (36.6  C) (Tympanic)   Resp 16   Wt 72.1 kg (159 lb)   SpO2 98%   BMI 23.48 kg/m      Physical Exam  Musculoskeletal:      Left shoulder: Decreased range of motion.      Comments: Pain with extension over head, slightly limited across body. Denies numbness or tingling. Pain is worse in morning,improves throughout the day then is bad again at end of day.         ASSESSMENT:    1. Acute pain of left shoulder    - XR Shoulder Left 2 Views; Future  - Orthopedic  Referral; Future  - predniSONE (DELTASONE) 20 MG tablet; Take 2 tablets (40 mg) by mouth daily for 5 days  Dispense: 10 tablet; Refill: 0 -side effects of prednisone discussed, pt verbalized understanding.      PLAN:  Take steroid daily for 5 days for inflammation.  Rest the shoulder, ice 15 min off and on for next 2-3  days.  Gentle stretching as able.  Follow up with ortho if it continues to be painful.

## 2024-11-10 ENCOUNTER — HEALTH MAINTENANCE LETTER (OUTPATIENT)
Age: 24
End: 2024-11-10

## 2024-11-21 ENCOUNTER — OFFICE VISIT (OUTPATIENT)
Dept: URGENT CARE | Facility: URGENT CARE | Age: 24
End: 2024-11-21
Payer: COMMERCIAL

## 2024-11-21 VITALS
BODY MASS INDEX: 23.48 KG/M2 | HEART RATE: 81 BPM | RESPIRATION RATE: 16 BRPM | TEMPERATURE: 98.2 F | OXYGEN SATURATION: 100 % | DIASTOLIC BLOOD PRESSURE: 72 MMHG | WEIGHT: 159 LBS | SYSTOLIC BLOOD PRESSURE: 127 MMHG

## 2024-11-21 DIAGNOSIS — K62.5 RECTAL BLEEDING: Primary | ICD-10-CM

## 2024-11-21 NOTE — PROGRESS NOTES
URGENT CARE  Assessment & Plan   Assessment:   David Dejesus is a 24 year old male who's clinical presentation today is consistent with:   1. Rectal bleeding   -  Primary Care Referral; Future  Plan:  Discussed with patient he possibly has hemorrhoids causing his bleeding however no external hemorrhoids were noted today, we discussed also on my differential is polyps or a colitis, no alarm signs present today that would warrant an emergent work up today; discussed with patient that I recommend a conservative treatment approach first, by having him follow-up with primary care for further evaluation and treatment and a subsequent colonoscopy to help definitively diagnose his bleeding.  Can try over the counter ointments like Preparation H and Witch Hazel Pads to help relieve itching   Follow up with pcp in the next week, sooner if symptoms worsen, return precautions given  No alarm signs or symptoms present   Differential Diagnoses for this patient's chief complaint that I considered include: hemorrhoids (internal or external); Polyps, rectal or colon malignancy malignancy, Excoriations, localized contact/irritative dermatitis, Fungal infection, anal fissures, Anal dermatological disease, anal abscess, fistula, Anal squamous cell carcinoma, rectal prolapse, rectal ulcer, Prostate pathology, condylomata acuminata (anogenital warts); IBS, colitis, Crohn's, gluten intolerance     Patient is agreeable to treatment plan and state they will follow-up if symptoms do not improve and/or if symptoms worsen   see patient's AVS 'monitor for' section for specific patient instructions given and discussed regarding what to watch for and when to follow up    EILEEN Huggins [facility] [place]    ______________________________________________________________________      Subjective     HPI: David Dejesus  is a 24 year old  male who presents today for evaluation the following concerns:   Patient presents today  with complaint of rectal bleeding.   The bleeding started 2 days ago  The bleeding amount is descried as: minimal blood streaked toilet paper, no drops in toilet  Patient states only occurs after diarrhea and wiping   Patient denies any: abdominal pain, cramps, painful defecation, chills and fever, fatigue and malaise, constipation, or  weight loss   Patient does endorse chronic diarrhea.    The patient does not have any perirectal problems, such as a history of  hemorrhoids denies any hx of:  colitis (I.B.D.), cancer, and infection (C. Difficile), IBS   The patient denies any  dehydration, lightheadedness, weakness, syncope.    Review of Systems:  Pertinent review of systems as reflected in HPI, otherwise negative.     Objective    Physical Exam:  Vitals:    11/21/24 1159   BP: 127/72   Pulse: 81   Resp: 16   Temp: 98.2  F (36.8  C)   TempSrc: Tympanic   SpO2: 100%   Weight: 72.1 kg (159 lb)      General:   alert and oriented, no acute distress, non ill-appearing   Vital signs reviewed: afebrile and normotensive    SKIN: intact   ABD: soft,  non-distended   nonTender to palpation   Rectal: Inspection of the anal verge and perianal area:    were negative for any external hemorrhoids or prolapsed internal hemorrhoids   Additionally no skin tags, fissures, abscesses or condyloma were seen   VIKI was performed with patient in the left lateral position with patient at rest and while   straining/bearing down and was without masses or fluctuance, additionally nontender  ______________________________________________________________________    I explained my diagnostic considerations and recommendations to the patient, who voiced understanding and agreement with the treatment plan.   All questions were answered.   We discussed potential side effects, risks and benefits of any prescribed or recommended therapies, as well as expectations for response to treatments.  Please see AVS for any patient instructions & handouts  given.   Patient was advised to contact the Nurse Care Line, their Primary Care provider, Urgent Care, or the Emergency Department if there are new or worsening symptoms, or call 911 for emergencies.

## 2024-11-21 NOTE — PATIENT INSTRUCTIONS
Diagnosis: Hemorrhoids -possibly   Polyps, or a colitis     Plan:   Can use a topical preparation H cream to help with itching   Avoid  prolonged sitting and straining with bowel movements.  Increase fiber in your diet. Eat high fiber foods like fruits, vegetables, beans, bran, and whole grains.  Stay hydrated by drinking plenty of water (minimum of 8 glasses x 8 ounces = 64 ounces/day) (8 x 8 rule).   Refer to regular doctor to get a colonoscopy for definitive diagnosis     Monitor for:   Increased bleeding from the rectum: a lot of blood, not just drops   Large amounts of blood in stool or black, tarry stools  Increased pain around the rectum or anus  Weakness or dizziness  Trouble breathing or swallowing  Fainting or loss of consciousness  Unusually fast heart rate  Shortness of breath   Vomiting blood      Hemorrhoids    Hemorrhoids are swollen and inflamed veins inside the rectum and near the anus. The rectum is the last several inches of the colon.   The veins can become swollen because of increased pressure in them.   This is most often caused by:  Long-term (chronic) constipation or diarrhea  Straining when having a bowel movement  Sitting too long on the toilet  A low-fiber diet  Pregnancy  Weakening of supporting tissues of the anus and rectum from aging  Common Symptoms are   Bleeding from the rectum. You may notice this after bowel movements. If you see blood from bowel movements, talk with your provider. They can determine if it's likely to be hemorrhoids, or if you need more assessment.  Lump near the anus  Itching around the anus  Pain around the anus  Mucus leaks from the anus  There are different types of hemorrhoids. Depending on the type you have and the severity, you may be able to treat yourself at home. In some cases, certain procedures may be the best treatment options. Your healthcare provider can tell you more about this, if needed.

## 2024-12-04 ENCOUNTER — HOSPITAL ENCOUNTER (EMERGENCY)
Facility: CLINIC | Age: 24
Discharge: HOME | End: 2024-12-04
Attending: STUDENT IN AN ORGANIZED HEALTH CARE EDUCATION/TRAINING PROGRAM
Payer: COMMERCIAL

## 2024-12-04 VITALS
TEMPERATURE: 97.9 F | DIASTOLIC BLOOD PRESSURE: 84 MMHG | RESPIRATION RATE: 18 BRPM | WEIGHT: 160 LBS | HEART RATE: 96 BPM | SYSTOLIC BLOOD PRESSURE: 142 MMHG | OXYGEN SATURATION: 97 % | BODY MASS INDEX: 23.63 KG/M2

## 2024-12-04 DIAGNOSIS — R21 RASH: ICD-10-CM

## 2024-12-04 LAB
ALBUMIN UR-MCNC: 30 MG/DL
APPEARANCE UR: CLEAR
BILIRUB UR QL STRIP: NEGATIVE
COLOR UR AUTO: YELLOW
GLUCOSE UR STRIP-MCNC: NEGATIVE MG/DL
HGB UR QL STRIP: NEGATIVE
KETONES UR STRIP-MCNC: 5 MG/DL
LEUKOCYTE ESTERASE UR QL STRIP: NEGATIVE
MUCOUS THREADS #/AREA URNS LPF: PRESENT /LPF
NITRATE UR QL: NEGATIVE
PH UR STRIP: 5 [PH] (ref 5–7)
RBC URINE: 2 /HPF
SP GR UR STRIP: 1.03 (ref 1–1.03)
UROBILINOGEN UR STRIP-MCNC: NORMAL MG/DL
WBC URINE: 0 /HPF

## 2024-12-04 PROCEDURE — 87491 CHLMYD TRACH DNA AMP PROBE: CPT | Performed by: STUDENT IN AN ORGANIZED HEALTH CARE EDUCATION/TRAINING PROGRAM

## 2024-12-04 PROCEDURE — 99283 EMERGENCY DEPT VISIT LOW MDM: CPT | Performed by: STUDENT IN AN ORGANIZED HEALTH CARE EDUCATION/TRAINING PROGRAM

## 2024-12-04 PROCEDURE — 81001 URINALYSIS AUTO W/SCOPE: CPT | Performed by: STUDENT IN AN ORGANIZED HEALTH CARE EDUCATION/TRAINING PROGRAM

## 2024-12-04 RX ORDER — DOXYCYCLINE 100 MG/1
100 CAPSULE ORAL 2 TIMES DAILY
Qty: 20 CAPSULE | Refills: 0 | Status: SHIPPED | OUTPATIENT
Start: 2024-12-04 | End: 2024-12-14

## 2024-12-04 ASSESSMENT — ACTIVITIES OF DAILY LIVING (ADL): ADLS_ACUITY_SCORE: 41

## 2024-12-05 LAB
C TRACH DNA SPEC QL PROBE+SIG AMP: NEGATIVE
N GONORRHOEA DNA SPEC QL NAA+PROBE: NEGATIVE

## 2024-12-05 NOTE — DISCHARGE INSTRUCTIONS
You can be reassured that I do not think your rash is related to an STD.  We discussed treating you with antibiotics for possible STD, but you decided to wait for the test results.  I also offered an HIV test and you have opted to wait.  I suspect that your symptoms are due to something called folliculitis.  This is inflammation of the hair follicles likely caused from you shaving and excessive sweating.  I recommend you do not shave your groin.  Make sure to keep the area clean and dry and use antibacterial soap.  You can start the antibiotics to help the rash heal.  [facility] of your [facility] testing because you will need treatment if these come back positive.  Make sure you are using condoms in the future.  Follow-up with your regular doctor with ongoing concerns.  Return to the ER with new or concerning symptoms.

## 2024-12-05 NOTE — ED TRIAGE NOTES
Arrives from home concerned for abdominal rash that spreads downward & around testicles, along with urinary urgency & yellowish discharge. Concerned for STI's.

## 2024-12-05 NOTE — ED PROVIDER NOTES
History     Chief Complaint   Patient presents with    Rash    Urinary Frequency     HPI  David Dejesus is a 24 year old male who generalized anxiety disorder, chronic insomnia, who presents to the ER for evaluation of a rash.  Patient states that he developed a rash yesterday and he is concerned about an STI.  He states he has had numerous sexual partners and not used protection.  He denies a history of STIs.  He states that he has had a reddish rash in his groin and lower abdomen.  He states he has noticed a rash like this in his groin before but it has always gone away.  He states it seems to be worse after working out.  It is not painful or itchy or burning.  He denies fever.  He denies any rash on his scrotum or penis and states his genitals are normal.  No testicular swelling or pain. He has no penile discharge.  He denies urinary urgency, frequency, hematuria or dysuria.  No fevers.  No abdominal pain.  No back pain.  No nausea or vomiting.  He does note that 2 days ago he shaved his groin and lower abdomen.    Allergies:  No Known Allergies    Problem List:    Patient Active Problem List    Diagnosis Date Noted    Cannabis abuse with unspecified cannabis-induced disorder (H) 02/11/2024     Priority: Medium    Depression, unspecified 01/19/2022     Priority: Medium    Acne vulgaris 06/11/2019     Priority: Medium    Rage attacks 11/30/2018     Priority: Medium    ROYCE (generalized anxiety disorder) 11/30/2018     Priority: Medium    Chronic insomnia 11/30/2018     Priority: Medium        Past Medical History:    No past medical history on file.    Past Surgical History:    No past surgical history on file.    Family History:    No family history on file.    Social History:  Marital Status:  Single [1]  Social History     Tobacco Use    Smoking status: Never    Smokeless tobacco: Former     Types: Chew    Tobacco comments:     Uses a vape   Vaping Use    Vaping status: Every Day    Start date: 2/2/2017    Substance Use Topics    Alcohol use: No    Drug use: No        Medications:    doxycycline hyclate (VIBRAMYCIN) 100 MG capsule  buPROPion (WELLBUTRIN XL) 150 MG 24 hr tablet  hydrOXYzine HCl (ATARAX) 50 MG tablet          Review of Systems  See HPI  Physical Exam   BP: (!) 142/84  Pulse: 96  Temp: 97.9  F (36.6  C)  Resp: 18  Weight: 72.6 kg (160 lb)  SpO2: 97 %      Physical Exam  BP (!) 142/84   Pulse 96   Temp 97.9  F (36.6  C) (Oral)   Resp 18   Wt 72.6 kg (160 lb)   SpO2 97%   BMI 23.63 kg/m    General: alert, interactive, in no apparent distress  Head: atraumatic  Nose: no rhinorrhea or epistaxis  Ears: no external auditory canal discharge or bleeding.    Eyes: Sclera nonicteric. Conjunctiva noninjected. PERRL, EOMI  Mouth: no tonsillar erythema, edema, or exudate.  Moist mucous membranes  Neck: supple, moving spontaneously no midline cervical tenderness  Lungs: No increased work of breathing.  Clear to auscultation bilaterally.  CV: RRR, peripheral pulses palpable and symmetric  Abdomen: soft, nt, nd, no guarding or rebound.  : Normal-appearing male external genitalia with bilateral descended testicles.  Testicles are nontender to palpation.  There is no scrotal erythema or testicular tenderness.  There are no rashes or lesions noted on the scrotum or penis.  There is no penile discharge.  There is no lymphadenopathy in the groin.  There is small pustular rash noted on the pubis and lower abdomen and intertriginous areas.  There is no warmth or generalized erythema of the skin  Extremities: Warm and well-perfused.    Skin: no rash or diaphoresis  Neuro: CN II-XII grossly intact, normal gait, moving all extremities    ED Course        Procedures             Critical Care time:  none             No results found for this or any previous visit (from the past 24 hours).    Medications - No data to display    Assessments & Plan (with Medical Decision Making)     I have reviewed the nursing notes.    I  have reviewed the findings, diagnosis, plan and need for follow up with the patient.          Medical Decision Making  David Dejesus is a 24 year old male who generalized anxiety disorder, chronic insomnia, who presents to the ER for evaluation of a rash.  Vital signs reviewed and reassuring.  Patient is well-appearing on exam.  History and exam suggest likely folliculitis.  Does not appear consistent with STI.  Suspect this is likely from him shaving 2 days ago.  He has no lesions on his genitals themselves and no penile discharge.  I did test him for gonorrhea and chlamydia.  Offered him syphilis and HIV testing and he declined.  We discussed empiric treatment for gonorrhea and chlamydia but he wants to hold off until the test results come back.  I do think this is fairly reasonable as I have a low suspicion for STI at this time.  In regards to the folliculitis, I recommended hygiene and washing with antibacterial soap, he should not shave the area.  I also gave him a prescription for doxycycline to use if the area is not improving.  He will follow along with the results on MyChart.  Recommend close outpatient follow-up if not proving.  Additional reassurance anticipatory guidance discussed.  Patient instructed to use condoms.  Return precautions discussed as well.        New Prescriptions    DOXYCYCLINE HYCLATE (VIBRAMYCIN) 100 MG CAPSULE    Take 1 capsule (100 mg) by mouth 2 times daily for 10 days.       Final diagnoses:   Rash - Suspect folliculitis       12/4/2024   [facility] EMERGENCY DEPT       Ezio Rene MD  12/04/24 [id number]